# Patient Record
Sex: MALE | Race: WHITE | NOT HISPANIC OR LATINO
[De-identification: names, ages, dates, MRNs, and addresses within clinical notes are randomized per-mention and may not be internally consistent; named-entity substitution may affect disease eponyms.]

---

## 2017-03-21 ENCOUNTER — RX ONLY (OUTPATIENT)
Age: 47
Setting detail: RX ONLY
End: 2017-03-21

## 2017-03-21 PROBLEM — D23.39 OTHER BENIGN NEOPLASM OF SKIN OF OTHER PARTS OF FACE: Status: ACTIVE | Noted: 2017-03-21

## 2017-03-21 PROBLEM — D23.4 OTHER BENIGN NEOPLASM OF SKIN OF SCALP AND NECK: Status: ACTIVE | Noted: 2017-03-21

## 2017-04-04 PROBLEM — D49.2 NEOPLASM OF UNSPECIFIED BEHAVIOR OF BONE, SOFT TISSUE, AND SKIN: Status: RESOLVED | Noted: 2017-03-21 | Resolved: 2017-04-04

## 2018-12-27 ENCOUNTER — OFFICE VISIT (OUTPATIENT)
Dept: URGENT CARE | Facility: PHYSICIAN GROUP | Age: 48
End: 2018-12-27
Payer: COMMERCIAL

## 2018-12-27 VITALS
TEMPERATURE: 99.3 F | SYSTOLIC BLOOD PRESSURE: 128 MMHG | WEIGHT: 234 LBS | HEIGHT: 69 IN | BODY MASS INDEX: 34.66 KG/M2 | OXYGEN SATURATION: 97 % | RESPIRATION RATE: 16 BRPM | DIASTOLIC BLOOD PRESSURE: 80 MMHG | HEART RATE: 79 BPM

## 2018-12-27 DIAGNOSIS — J22 LOWER RESP. TRACT INFECTION: ICD-10-CM

## 2018-12-27 DIAGNOSIS — J06.9 VIRAL URI WITH COUGH: ICD-10-CM

## 2018-12-27 PROCEDURE — 99214 OFFICE O/P EST MOD 30 MIN: CPT | Performed by: NURSE PRACTITIONER

## 2018-12-27 RX ORDER — ALBUTEROL SULFATE 90 UG/1
2 AEROSOL, METERED RESPIRATORY (INHALATION) EVERY 6 HOURS PRN
Qty: 1 INHALER | Refills: 0 | Status: SHIPPED | OUTPATIENT
Start: 2018-12-27 | End: 2021-11-18

## 2018-12-27 RX ORDER — BENZONATATE 200 MG/1
200 CAPSULE ORAL 3 TIMES DAILY PRN
Qty: 30 CAP | Refills: 0 | Status: SHIPPED | OUTPATIENT
Start: 2018-12-27 | End: 2021-03-11

## 2018-12-27 RX ORDER — CARBAMAZEPINE 300 MG/1
300 CAPSULE, EXTENDED RELEASE ORAL 2 TIMES DAILY
Refills: 6 | COMMUNITY

## 2018-12-27 RX ORDER — AZITHROMYCIN 250 MG/1
TABLET, FILM COATED ORAL
Qty: 6 TAB | Refills: 0 | Status: SHIPPED | OUTPATIENT
Start: 2018-12-27 | End: 2021-03-11

## 2018-12-27 RX ORDER — TIMOLOL MALEATE 5 MG/ML
1 SOLUTION/ DROPS OPHTHALMIC DAILY
Refills: 6 | COMMUNITY
Start: 2018-11-21 | End: 2021-03-11

## 2018-12-27 ASSESSMENT — ENCOUNTER SYMPTOMS
PALPITATIONS: 0
DOUBLE VISION: 0
DIZZINESS: 0
FEVER: 0
CHILLS: 1
WHEEZING: 0
STRIDOR: 0
MUSCULOSKELETAL NEGATIVE: 1
DIARRHEA: 0
CONSTIPATION: 0
BLURRED VISION: 0
NAUSEA: 0
SORE THROAT: 1
VOMITING: 0
COUGH: 1
ABDOMINAL PAIN: 0
HEADACHES: 0
MYALGIAS: 0

## 2018-12-28 NOTE — PROGRESS NOTES
Subjective:   Domenic Glover is a 48 y.o. male who presents for Cough (x 5 days); Pharyngitis; Headache; and Chills        HPI   Patient presents with new onset sore throat, cough, and chills that started 5 days ago. He states the cough is intermittent and worse at night, productive at times with yellow sputum. He has been taking OTC medications with little relief. Denies alleviating factors.  Rates symptoms as moderate in severity.    He has received flu vaccine this season    Review of Systems   Constitutional: Positive for chills. Negative for fever and malaise/fatigue.   HENT: Positive for sore throat. Negative for congestion, ear discharge and ear pain.    Eyes: Negative for blurred vision and double vision.   Respiratory: Positive for cough. Negative for wheezing and stridor.    Cardiovascular: Negative for chest pain and palpitations.   Gastrointestinal: Negative for abdominal pain, constipation, diarrhea, nausea and vomiting.   Musculoskeletal: Negative.  Negative for myalgias.   Skin: Negative.  Negative for itching and rash.   Neurological: Negative for dizziness and headaches.   All other systems reviewed and are negative.    PMH:  has a past medical history of Abdominal pain, other specified site (2/14/2013); Allergic rhinitis (2/3/2012); Anesthesia; Arrhythmia; Arthritis; Cough (4/1/2013); Dental disorder; Flank pain (10/31/2012); GERD (gastroesophageal reflux disease); HEMATURIA (10/31/2012); History of nephrolithiasis (10/31/2012); Hypertriglyceridemia (10/31/2012); LBP (low back pain) (11/15/2012); MDD (major depressive disorder) (11/15/2012); Pain (02-20-12); Pneumonia (01/2009); Premature ventricular contractions (PVCs) (VPCs) (2/3/2012); Psychiatric problem; PVCs (premature ventricular contractions); Renal disorder (09/2009); and Snoring.  MEDS:   Current Outpatient Prescriptions:   •  albuterol 108 (90 Base) MCG/ACT Aero Soln inhalation aerosol, Inhale 2 Puffs by mouth every 6 hours as needed  "for Shortness of Breath., Disp: 1 Inhaler, Rfl: 0  •  benzonatate (TESSALON) 200 MG capsule, Take 1 Cap by mouth 3 times a day as needed for Cough., Disp: 30 Cap, Rfl: 0  •  azithromycin (ZITHROMAX) 250 MG Tab, Take two tabs po day one followed by one tab po day two through five with food, Disp: 6 Tab, Rfl: 0  •  escitalopram (LEXAPRO) 10 MG Tab, 10 mg., Disp: , Rfl: 5  •  carbamazepine (CARBATROL) 300 MG CR capsule, Take 300 mg by mouth 2 times a day., Disp: , Rfl: 6  •  timolol (TIMOPTIC) 0.5 % Solution, Place 1 Drop in right eye every day., Disp: , Rfl: 6  •  Multiple Vitamin (MULTIVITAMIN PO), Take  by mouth every day., Disp: , Rfl:   •  ibuprofen (MOTRIN IB) 200 MG TABS, Take 4 Tabs by mouth 2 times a day as needed for Mild Pain., Disp: , Rfl:   ALLERGIES:   Allergies   Allergen Reactions   • Other Drug      Numbing eye gtt, eye swelling     SURGHX:   Past Surgical History:   Procedure Laterality Date   • SHOULDER ARTHROSCOPY W/ BICIPITAL TENODESIS REPAIR  2/22/2012    Performed by STEVENSON BRUCE at SUR[facility]ERY [facility] ORS   • SHOULDER DECOMPRESSION  2/22/2012    Performed by STEVENSON BRUCE at SUR[facility] ORS   • CASE CANCELLED  2/1/2012    Performed by STEVENSON BRUCE at [facility] ORS   • KNEE ARTHROSCOPY  11/12/2009    Performed by ANGELA INFANTE at SURGERY [facility] ORS   • INGUINAL HERNIA REPAIR  2007    left   • BICEPS TENDON REPAIR  2005    left   • BICEPS TENDON REPAIR  2005    hardware removal   • OTHER  1996    tonsillectomy   • OTHER  1995    sinus surgery   • HUMERUS ORIF  1990    left   • HUMERUS ORIF  1990    hardware removal     SOCHX:  reports that he has never smoked. He has never used smokeless tobacco. He reports that he does not drink alcohol or use drugs.  FH: Family history was reviewed, no pertinent findings to report     Objective:   /80   Pulse 79   Temp 37.4 °C (99.3 °F) (Temporal)   Resp 16   Ht 1.753 m (5' 9\")   Wt 106.1 kg (234 lb)   SpO2 97%   BMI " 34.56 kg/m²   Physical Exam   Constitutional: He is oriented to person, place, and time. He appears well-developed and well-nourished.  Non-toxic appearance. He does not have a sickly appearance. He does not appear ill. No distress.   HENT:   Head: Normocephalic.   Right Ear: Hearing and ear canal normal. Tympanic membrane is not erythematous. A middle ear effusion is present.   Left Ear: Hearing, tympanic membrane and ear canal normal. Tympanic membrane is not erythematous.  No middle ear effusion.   Nose: No rhinorrhea. Right sinus exhibits no maxillary sinus tenderness and no frontal sinus tenderness. Left sinus exhibits no maxillary sinus tenderness and no frontal sinus tenderness.   Mouth/Throat: Oropharynx is clear and moist and mucous membranes are normal. No posterior oropharyngeal erythema. No tonsillar exudate.   Eyes: Pupils are equal, round, and reactive to light. Conjunctivae, EOM and lids are normal.   Neck: Normal range of motion. No thyromegaly present.   Cardiovascular: Normal rate, regular rhythm and normal heart sounds.    Pulmonary/Chest: Effort normal. No accessory muscle usage. No apnea, no tachypnea and no bradypnea. No respiratory distress. He has no decreased breath sounds. He has wheezes in the right upper field.   Abdominal: Soft. Bowel sounds are normal. There is no hepatosplenomegaly.   Lymphadenopathy:        Head (right side): No submandibular and no tonsillar adenopathy present.        Head (left side): No submandibular and no tonsillar adenopathy present.   Neurological: He is alert and oriented to person, place, and time.   Skin: Skin is warm and dry. No rash noted. He is not diaphoretic.   Psychiatric: He has a normal mood and affect. His speech is normal and behavior is normal. Judgment and thought content normal.   Vitals reviewed.        Assessment/Plan:   Assessment    1. Viral URI with cough  - albuterol 108 (90 Base) MCG/ACT Aero Soln inhalation aerosol; Inhale 2 Puffs by  mouth every 6 hours as needed for Shortness of Breath.  Dispense: 1 Inhaler; Refill: 0  - benzonatate (TESSALON) 200 MG capsule; Take 1 Cap by mouth 3 times a day as needed for Cough.  Dispense: 30 Cap; Refill: 0    2. Lower resp. tract infection  - azithromycin (ZITHROMAX) 250 MG Tab; Take two tabs po day one followed by one tab po day two through five with food  Dispense: 6 Tab; Refill: 0    Other orders  - carbamazepine (CARBATROL) 300 MG CR capsule; Take 300 mg by mouth 2 times a day.; Refill: 6  - timolol (TIMOPTIC) 0.5 % Solution; Place 1 Drop in right eye every day.; Refill: 6    It was explained to the patient today that due to the viral nature of the patient's illness, we will treat symptomatically. Encouraged OTC supportive meds PRN. Humidification and increase fluids.     Contingent antibiotic prescription given to patient to fill upon meeting criteria of guidelines discussed.     Patient encouraged to increase clear liquid intake    Differential diagnosis, natural history, supportive care, and indications for immediate follow-up discussed.

## 2018-12-30 ENCOUNTER — OFFICE VISIT (OUTPATIENT)
Dept: URGENT CARE | Facility: PHYSICIAN GROUP | Age: 48
End: 2018-12-30
Payer: COMMERCIAL

## 2018-12-30 VITALS
WEIGHT: 233 LBS | TEMPERATURE: 98.4 F | SYSTOLIC BLOOD PRESSURE: 128 MMHG | HEART RATE: 78 BPM | DIASTOLIC BLOOD PRESSURE: 78 MMHG | BODY MASS INDEX: 34.41 KG/M2 | RESPIRATION RATE: 16 BRPM | OXYGEN SATURATION: 94 %

## 2018-12-30 DIAGNOSIS — J06.9 URI WITH COUGH AND CONGESTION: ICD-10-CM

## 2018-12-30 DIAGNOSIS — J01.10 ACUTE NON-RECURRENT FRONTAL SINUSITIS: ICD-10-CM

## 2018-12-30 DIAGNOSIS — H66.012 ACUTE SUPPURATIVE OTITIS MEDIA OF LEFT EAR WITH SPONTANEOUS RUPTURE OF TYMPANIC MEMBRANE, RECURRENCE NOT SPECIFIED: ICD-10-CM

## 2018-12-30 PROCEDURE — 99214 OFFICE O/P EST MOD 30 MIN: CPT | Performed by: NURSE PRACTITIONER

## 2018-12-30 RX ORDER — AMOXICILLIN AND CLAVULANATE POTASSIUM 875; 125 MG/1; MG/1
1 TABLET, FILM COATED ORAL 2 TIMES DAILY
Qty: 14 TAB | Refills: 0 | Status: SHIPPED | OUTPATIENT
Start: 2018-12-30 | End: 2019-01-06

## 2019-01-03 ASSESSMENT — ENCOUNTER SYMPTOMS
SHORTNESS OF BREATH: 0
FEVER: 0
SORE THROAT: 0
HEMOPTYSIS: 0
CHILLS: 0
SINUS PAIN: 1
WEAKNESS: 0
COUGH: 1
WHEEZING: 0
MYALGIAS: 0
SPUTUM PRODUCTION: 0
DIAPHORESIS: 0

## 2019-01-03 NOTE — PROGRESS NOTES
Subjective:      Domenic Glover is a 48 y.o. male who presents with Cough (with L ear ruptured)            Patient was seen in urgent care 3 day ago with URI with cough.  He was prescribed a Z-pack, tessalon, and albuterol for a lower RTI.  He reports that his cough and chest congestion have improved, but he noted worsening nasal congestion with sinus pressure in the frontal region and left otalgia.  Twice he has had bloody, purulent drainage from the left ear in the past 24 hours.  He notes tinnitus in the left ear.  He denies any fever, chills, or myalgias.  He has frequent OM as a child, but not as an adult.          Review of Systems   Constitutional: Positive for malaise/fatigue. Negative for chills, diaphoresis and fever.   HENT: Positive for congestion, ear discharge, ear pain, sinus pain and tinnitus. Negative for hearing loss and sore throat.    Respiratory: Positive for cough. Negative for hemoptysis, sputum production, shortness of breath and wheezing.    Cardiovascular: Negative for chest pain.   Musculoskeletal: Negative for myalgias.   Neurological: Negative for weakness.     Medications, Allergies, and current problem list reviewed today in Epic     Objective:     /78   Pulse 78   Temp 36.9 °C (98.4 °F) (Temporal)   Resp 16   Wt 105.7 kg (233 lb)   SpO2 94%   BMI 34.41 kg/m²      Physical Exam   Constitutional: He is oriented to person, place, and time. He appears well-developed and well-nourished. No distress.   HENT:   Head: Normocephalic.   Right Ear: External ear normal.   Mouth/Throat: Oropharynx is clear and moist. No oropharyngeal exudate.   Nares patent but congested.  Frontal sinus TTP.  Left TM is erythematous with purulence behind the drum.  TM is presently intact with evidence of recent self-limited rupture with a small amount of bloody purulence int he canal.  NO canal swelling or erythema.  No pre- or post-auricular adenopathy.  NO mastoid swelling or TTP.  Hearing grossly  intact to voice.     Eyes: Pupils are equal, round, and reactive to light. Conjunctivae are normal. Right eye exhibits no discharge. Left eye exhibits no discharge. No scleral icterus.   Neck: Neck supple. No JVD present. No tracheal deviation present. No thyromegaly present.   Cardiovascular: Normal rate, regular rhythm and normal heart sounds.  Exam reveals no gallop and no friction rub.    No murmur heard.  Pulmonary/Chest: Effort normal and breath sounds normal. No stridor. No respiratory distress. He has no wheezes. He has no rales. He exhibits no tenderness.   No cough in clinic.   Musculoskeletal: He exhibits no edema.   Lymphadenopathy:     He has no cervical adenopathy.   Neurological: He is alert and oriented to person, place, and time.   Skin: Skin is warm and dry. He is not diaphoretic. No erythema. No pallor.   Vitals reviewed.              Assessment/Plan:     1. Acute suppurative otitis media of left ear with spontaneous rupture of tympanic membrane, recurrence not specified  amoxicillin-clavulanate (AUGMENTIN) 875-125 MG Tab   2. Acute non-recurrent frontal sinusitis  amoxicillin-clavulanate (AUGMENTIN) 875-125 MG Tab   3. URI with cough and congestion       Discussed exam findings with patient.  Differential reviewed.  Discontinue Zpack.  Take full course of antibiotics (augmentin).  OTC NSAIDs or tylenol prn fever, pain.  OTC cold medications prn symptom management.  Maintain adequate po hydration.  RTC in 5-7 days if symptoms persist, sooner if worse.  Patient verbalized understanding of and agreed with plan of care.

## 2021-03-11 ENCOUNTER — OFFICE VISIT (OUTPATIENT)
Dept: URGENT CARE | Facility: PHYSICIAN GROUP | Age: 51
End: 2021-03-11
Payer: COMMERCIAL

## 2021-03-11 VITALS
BODY MASS INDEX: 34.07 KG/M2 | HEIGHT: 69 IN | DIASTOLIC BLOOD PRESSURE: 76 MMHG | WEIGHT: 230 LBS | OXYGEN SATURATION: 96 % | SYSTOLIC BLOOD PRESSURE: 122 MMHG | RESPIRATION RATE: 16 BRPM | HEART RATE: 68 BPM | TEMPERATURE: 97.5 F

## 2021-03-11 DIAGNOSIS — H69.92 DYSFUNCTION OF LEFT EUSTACHIAN TUBE: ICD-10-CM

## 2021-03-11 PROCEDURE — 99213 OFFICE O/P EST LOW 20 MIN: CPT | Performed by: PHYSICIAN ASSISTANT

## 2021-03-11 RX ORDER — ESCITALOPRAM OXALATE 20 MG/1
20 TABLET ORAL DAILY
COMMUNITY

## 2021-03-11 NOTE — PROGRESS NOTES
Chief Complaint   Patient presents with   • Otalgia       HISTORY OF PRESENT ILLNESS: Patient is a 51 y.o. male who presents today because he has a 1 to 2-day history of left ear pain and sometimes dizziness.  He has not taken any medications for symptoms.  Denies any fevers, chills, nausea, vomiting or diarrhea    Patient Active Problem List    Diagnosis Date Noted   • Hypertriglyceridemia 10/31/2012   • Premature ventricular contractions (PVCs) (VPCs) 02/03/2012   • MDD (major depressive disorder) 11/15/2012   • Low back pain 11/15/2012   • Flank pain 10/31/2012   • History of nephrolithiasis 10/31/2012   • HEMATURIA 10/31/2012   • Allergic rhinitis due to allergen 02/03/2012   • Ingrown toenail 10/29/2013   • Sinusitis 10/29/2013   • Cough 04/01/2013   • GERD (gastroesophageal reflux disease) 02/14/2013   • Abdominal pain, other specified site 02/14/2013       Allergies:Other drug    Current Outpatient Medications Ordered in Epic   Medication Sig Dispense Refill   • carbamazepine (CARBATROL) 300 MG CR capsule Take 300 mg by mouth 2 times a day.  6   • escitalopram (LEXAPRO) 10 MG Tab 10 mg.  5   • ibuprofen (MOTRIN IB) 200 MG TABS Take 4 Tabs by mouth 2 times a day as needed for Mild Pain.     • escitalopram (LEXAPRO) 20 MG tablet TAKE 1 TABLET BY MOUTH DAILY     • albuterol 108 (90 Base) MCG/ACT Aero Soln inhalation aerosol Inhale 2 Puffs by mouth every 6 hours as needed for Shortness of Breath. 1 Inhaler 0   • Multiple Vitamin (MULTIVITAMIN PO) Take  by mouth every day.       No current [facility]-ordered facility-administered medications on file.       Past Medical History:   Diagnosis Date   • Abdominal pain, other specified site 2/14/2013   • Allergic rhinitis 2/3/2012   • Anesthesia     prolonged nausea and vomiting   • Arrhythmia     PVCs with bigemimy, cleared by Dr. Saini   • Arthritis    • Cough 4/1/2013   • Dental disorder     crown lower left   • Flank pain 10/31/2012   • GERD (gastroesophageal reflux  disease)    • HEMATURIA 10/31/2012   • History of nephrolithiasis 10/31/2012   • Hypertriglyceridemia 10/31/2012   • LBP (low back pain) 11/15/2012   • MDD (major depressive disorder) 11/15/2012   • Pain 12    left shoulder, 4/10   • Pneumonia 2009   • Premature ventricular contractions (PVCs) (VPCs) 2/3/2012    2012: Stress echocardiogram negative for ischemia, EF at rest 63%. Holter monitor with multiple interpolated PVCs, with occasional bigemy with pauses, resolve with exercise.    • Psychiatric problem     depression   • PVCs (premature ventricular contractions)    • Renal disorder 2009    kidney stones   • Snoring        Social History     Tobacco Use   • Smoking status: Never Smoker   • Smokeless tobacco: Never Used   Substance Use Topics   • Alcohol use: No   • Drug use: No       Family Status   Relation Name Status   • Mo  Alive   • Fa  Alive   • Sis  Alive   • Bro  Alive   • MGMo     • MGFa     • Sis  Alive   • Son  (Not Specified)     Family History   Problem Relation Age of Onset   • Thyroid Sister    • Cancer Maternal Grandmother         lung   • Heart Disease Maternal Grandfather         smoker   • Lung Disease Son         asthma   • Arthritis Son         scoliosis       ROS:  Review of Systems   Constitutional: Negative for fever, chills, weight loss and malaise/fatigue.   HENT: Positive for left ear pain, no nosebleeds, congestion, sore throat and neck pain.    Eyes: Negative for blurred vision.   Respiratory: Negative for cough, sputum production, shortness of breath and wheezing.    Cardiovascular: Negative for chest pain, palpitations, orthopnea and leg swelling.   Gastrointestinal: Negative for heartburn, nausea, vomiting and abdominal pain.   Genitourinary: Negative for dysuria, urgency and frequency.     Exam:  /76 (BP Location: Right arm, Patient Position: Sitting, BP Cuff Size: Adult)   Pulse 68   Temp 36.4 °C (97.5 °F) (Temporal)   Resp 16   Ht  "1.753 m (5' 9\")   Wt 104 kg (230 lb)   SpO2 96%   General:  Well nourished, well developed male in NAD  Head:Normocephalic, atraumatic  Eyes: PERRLA, EOM within normal limits, no conjunctival injection, no scleral icterus, visual fields and acuity grossly intact.  Ears: Normal shape and symmetry, no tenderness, no discharge. External canals are without any significant edema or erythema. Tympanic membranes are without any inflammation, moderate amount of cloudy fluid behind the tympanic membranes bilaterally, more so on the left with tympanic membrane retraction on the left. Gross auditory acuity is intact  Nose: Symmetrical without tenderness, no discharge.  Mouth: reasonable hygiene, no erythema exudates or tonsillar enlargement.  Neck: no masses, range of motion within normal limits, no tracheal deviation. No obvious thyroid enlargement.  Extremities: no clubbing, cyanosis, or edema.    Please note that this dictation was created using voice recognition software. I have made every reasonable attempt to correct obvious errors, but I expect that there are errors of grammar and possibly content that I did not discover before finalizing the note.    Assessment/Plan:  1. Dysfunction of left eustachian tube     Over-the-counter Sudafed    Followup with primary care in the next 7-10 days if not significantly improving, return to the urgent care or go to the emergency room sooner for any worsening of symptoms.       "

## 2021-11-05 ENCOUNTER — TELEPHONE (OUTPATIENT)
Dept: CARDIOLOGY | Facility: MEDICAL CENTER | Age: 51
End: 2021-11-05

## 2021-11-05 NOTE — TELEPHONE ENCOUNTER
Called patient in regards to upcoming appointment with [facility] on 11/18/2021. Per patient no other cardiologist has been seen since 05/2016. Recent labs have been completed at [facility]. Called [phone] and obtained recent labs. Labs have been scanned into patients chart. Patient is scheduled for an echocardiogram on 11/16/2021.    Confirmed appointment date, time, & location. Advised to please wear a mask and to call main office if their were any questions or concerns.

## 2021-11-16 ENCOUNTER — APPOINTMENT (OUTPATIENT)
Dept: RADIOLOGY | Facility: MEDICAL CENTER | Age: 51
End: 2021-11-16
Attending: EMERGENCY MEDICINE
Payer: COMMERCIAL

## 2021-11-16 ENCOUNTER — HOSPITAL ENCOUNTER (OUTPATIENT)
Dept: CARDIOLOGY | Facility: MEDICAL CENTER | Age: 51
End: 2021-11-16
Attending: INTERNAL MEDICINE
Payer: COMMERCIAL

## 2021-11-16 ENCOUNTER — HOSPITAL ENCOUNTER (EMERGENCY)
Facility: MEDICAL CENTER | Age: 51
End: 2021-11-16
Attending: EMERGENCY MEDICINE
Payer: COMMERCIAL

## 2021-11-16 VITALS
SYSTOLIC BLOOD PRESSURE: 135 MMHG | TEMPERATURE: 96.7 F | HEART RATE: 59 BPM | RESPIRATION RATE: 16 BRPM | BODY MASS INDEX: 34.58 KG/M2 | WEIGHT: 233.47 LBS | OXYGEN SATURATION: 96 % | DIASTOLIC BLOOD PRESSURE: 86 MMHG | HEIGHT: 69 IN

## 2021-11-16 DIAGNOSIS — R07.9 EXERTIONAL CHEST PAIN: ICD-10-CM

## 2021-11-16 DIAGNOSIS — R03.0 ELEVATED BLOOD PRESSURE READING WITHOUT DIAGNOSIS OF HYPERTENSION: ICD-10-CM

## 2021-11-16 DIAGNOSIS — R07.9 CHEST PAIN, UNSPECIFIED TYPE: ICD-10-CM

## 2021-11-16 LAB
ALBUMIN SERPL BCP-MCNC: 4.4 G/DL (ref 3.2–4.9)
ALBUMIN/GLOB SERPL: 2.2 G/DL
ALP SERPL-CCNC: 117 U/L (ref 30–99)
ALT SERPL-CCNC: 17 U/L (ref 2–50)
ANION GAP SERPL CALC-SCNC: 16 MMOL/L (ref 7–16)
AST SERPL-CCNC: 17 U/L (ref 12–45)
BASOPHILS # BLD AUTO: 0.9 % (ref 0–1.8)
BASOPHILS # BLD: 0.06 K/UL (ref 0–0.12)
BILIRUB SERPL-MCNC: 0.3 MG/DL (ref 0.1–1.5)
BUN SERPL-MCNC: 13 MG/DL (ref 8–22)
CALCIUM SERPL-MCNC: 9 MG/DL (ref 8.5–10.5)
CHLORIDE SERPL-SCNC: 106 MMOL/L (ref 96–112)
CO2 SERPL-SCNC: 20 MMOL/L (ref 20–33)
CREAT SERPL-MCNC: 0.88 MG/DL (ref 0.5–1.4)
EKG IMPRESSION: NORMAL
EOSINOPHIL # BLD AUTO: 0.22 K/UL (ref 0–0.51)
EOSINOPHIL NFR BLD: 3.5 % (ref 0–6.9)
ERYTHROCYTE [DISTWIDTH] IN BLOOD BY AUTOMATED COUNT: 38.2 FL (ref 35.9–50)
GLOBULIN SER CALC-MCNC: 2 G/DL (ref 1.9–3.5)
GLUCOSE SERPL-MCNC: 101 MG/DL (ref 65–99)
HCT VFR BLD AUTO: 48.3 % (ref 42–52)
HGB BLD-MCNC: 16.5 G/DL (ref 14–18)
IMM GRANULOCYTES # BLD AUTO: 0.08 K/UL (ref 0–0.11)
IMM GRANULOCYTES NFR BLD AUTO: 1.3 % (ref 0–0.9)
LV EJECT FRACT  99904: 60
LYMPHOCYTES # BLD AUTO: 1.39 K/UL (ref 1–4.8)
LYMPHOCYTES NFR BLD: 21.9 % (ref 22–41)
MCH RBC QN AUTO: 28.3 PG (ref 27–33)
MCHC RBC AUTO-ENTMCNC: 34.2 G/DL (ref 33.7–35.3)
MCV RBC AUTO: 82.8 FL (ref 81.4–97.8)
MONOCYTES # BLD AUTO: 0.41 K/UL (ref 0–0.85)
MONOCYTES NFR BLD AUTO: 6.4 % (ref 0–13.4)
NEUTROPHILS # BLD AUTO: 4.2 K/UL (ref 1.82–7.42)
NEUTROPHILS NFR BLD: 66 % (ref 44–72)
NRBC # BLD AUTO: 0 K/UL
NRBC BLD-RTO: 0 /100 WBC
PLATELET # BLD AUTO: 326 K/UL (ref 164–446)
PMV BLD AUTO: 8.8 FL (ref 9–12.9)
POTASSIUM SERPL-SCNC: 4.1 MMOL/L (ref 3.6–5.5)
PROT SERPL-MCNC: 6.4 G/DL (ref 6–8.2)
RBC # BLD AUTO: 5.83 M/UL (ref 4.7–6.1)
SODIUM SERPL-SCNC: 142 MMOL/L (ref 135–145)
TROPONIN T SERPL-MCNC: 9 NG/L (ref 6–19)
TROPONIN T SERPL-MCNC: <6 NG/L (ref 6–19)
WBC # BLD AUTO: 6.4 K/UL (ref 4.8–10.8)

## 2021-11-16 PROCEDURE — 93350 STRESS TTE ONLY: CPT | Mod: 26 | Performed by: INTERNAL MEDICINE

## 2021-11-16 PROCEDURE — 84484 ASSAY OF TROPONIN QUANT: CPT

## 2021-11-16 PROCEDURE — 99284 EMERGENCY DEPT VISIT MOD MDM: CPT | Performed by: INTERNAL MEDICINE

## 2021-11-16 PROCEDURE — 93005 ELECTROCARDIOGRAM TRACING: CPT

## 2021-11-16 PROCEDURE — 85025 COMPLETE CBC W/AUTO DIFF WBC: CPT

## 2021-11-16 PROCEDURE — 99284 EMERGENCY DEPT VISIT MOD MDM: CPT

## 2021-11-16 PROCEDURE — 93018 CV STRESS TEST I&R ONLY: CPT | Performed by: INTERNAL MEDICINE

## 2021-11-16 PROCEDURE — 71045 X-RAY EXAM CHEST 1 VIEW: CPT

## 2021-11-16 PROCEDURE — 80053 COMPREHEN METABOLIC PANEL: CPT

## 2021-11-16 PROCEDURE — 93005 ELECTROCARDIOGRAM TRACING: CPT | Performed by: EMERGENCY MEDICINE

## 2021-11-16 PROCEDURE — 93017 CV STRESS TEST TRACING ONLY: CPT

## 2021-11-16 NOTE — ED TRIAGE NOTES
52 y/o male to triage by w/c   Chief Complaint   Patient presents with   • Chest Pain   • Arm Pain     Pt was sent here by heart inst.  Pt had chest pain during a treadmill stress test

## 2021-11-16 NOTE — PROGRESS NOTES
"Pt arrived for stress echocardiogram.  Test explained to patient and all questions answered.  Pt experienced \"mild\" chest discomfort early into exercise portion of exam.  Chest discomfort resolved with continued exercise.  During Yovany Protocol recovery phase, pt began to experience chest discomfort and LUE discomfort.  EKG changes noted, shown to Dr. Lynch.  Pt's chest discomfort not resolving with extended recovery period. VSS.  Dr. Lynch recommended taking the patient to the ED for further evaluation.  Pt and pt's wife escorted to the ER via WC.  ED Charge Nurse aware of patient's arrival.  "

## 2021-11-17 NOTE — ED PROVIDER NOTES
ED Provider Note    ED Provider Note    Primary care provider: Moses Martinez M.D.  Means of arrival: Wheelchair/walk-in  History obtained from: Patient    CHIEF COMPLAINT  Chief Complaint   Patient presents with   • Chest Pain   • Arm Pain     Seen at 8:33 PM.   HPI  Domenic Glover is a 51 y.o. male who presents to the Emergency Department for chest pain.  The patient was undergoing an exercise treadmill test today in our facility when he developed left-sided chest pain with associated left upper extremity and left lower extremity tingling.  The pain occurred when he was at 85% on the treadmill and had a heart rate of about 141.  The patient noted he did improve after he stopped exerting himself.  He relates a similar experience when he was walking a few months ago, he states when he is walking for about 5 miles he developed some left-sided chest discomfort and left upper extremity tingling.  He saw his primary care doctor and they set him up for the exercise treadmill test that he took today.    He does not smoke, he denies any history of coronary artery disease.  He did have a cardiac evaluation in 2012 for preoperative clearance.  He denies any fevers, chills, nausea, vomiting, abdominal pain, dyspnea on exertion.    REVIEW OF SYSTEMS  See HPI,   Remainder of ROS negative.     PAST MEDICAL HISTORY   has a past medical history of Abdominal pain, other specified site (2/14/2013), Allergic rhinitis (2/3/2012), Anesthesia, Arrhythmia, Arthritis, Cough (4/1/2013), Dental disorder, Flank pain (10/31/2012), GERD (gastroesophageal reflux disease), HEMATURIA (10/31/2012), History of nephrolithiasis (10/31/2012), Hypertriglyceridemia (10/31/2012), LBP (low back pain) (11/15/2012), MDD (major depressive disorder) (11/15/2012), Pain (02-20-12), Pneumonia (01/2009), Premature ventricular contractions (PVCs) (VPCs) (2/3/2012), Psychiatric problem, PVCs (premature ventricular contractions), Renal disorder (09/2009), and  "Snoring.    SURGICAL HISTORY   has a past surgical history that includes inguinal hernia repair (2007); other (1995); other (1996); biceps tendon repair (2005); biceps tendon repair (2005); humerus orif (1990); humerus orif (1990); knee arthroscopy (11/12/2009); case cancelled (2/1/2012); shoulder arthroscopy w/ bicipital tenodesis repair (2/22/2012); and shoulder decompression (2/22/2012).    SOCIAL HISTORY  Social History     Tobacco Use   • Smoking status: Never Smoker   • Smokeless tobacco: Never Used   Substance Use Topics   • Alcohol use: No   • Drug use: No      Social History     Substance and Sexual Activity   Drug Use No       FAMILY HISTORY  Family History   Problem Relation Age of Onset   • Thyroid Sister    • Cancer Maternal Grandmother         lung   • Heart Disease Maternal Grandfather         smoker   • Lung Disease Son         asthma   • Arthritis Son         scoliosis       CURRENT MEDICATIONS  Reviewed.  See Encounter Summary.     ALLERGIES  Allergies   Allergen Reactions   • Other Drug      Numbing eye gtt, eye swelling       PHYSICAL EXAM  VITAL SIGNS: /86   Pulse (!) 59   Temp 35.9 °C (96.7 °F) (Temporal)   Resp 16   Ht 1.753 m (5' 9\")   Wt 106 kg (233 lb 7.5 oz)   SpO2 96%   BMI 34.48 kg/m²   Constitutional: Awake, alert in no apparent distress.  HENT: Normocephalic, Bilateral external ears normal. Nose normal.   Eyes: Conjunctiva normal, non-icteric, EOMI.    Thorax & Lungs: Easy unlabored respirations, Clear to ascultation bilaterally.  Cardiovascular: Regular rate, Regular rhythm, No murmurs, rubs or gallops. Bilateral pulses symmetrical.   Abdomen:  Soft, nontender, nondistended, normal active bowel sounds.   :    Skin: Visualized skin is  Dry, No erythema, No rash.   Musculoskeletal:   No cyanosis, clubbing or edema. No leg asymmetry.   Neurologic: Alert, Grossly non-focal.   Psychiatric: Normal affect, Normal mood  Lymphatic:  No cervical LAD    EKG   12 lead Interpreted " by me  Rhythm:  Normal sinus rhythm   Rate: 82  Axis: normal  Ectopy: none  Conduction: normal  ST Segments: no acute change  T Waves: no acute change  Clinical Impression: Normal EKG without acute changes     RADIOLOGY  DX-CHEST-PORTABLE (1 VIEW)   Final Result      No acute cardiac or pulmonary abnormalities are identified.            COURSE & MEDICAL DECISION MAKING  Pertinent Labs & Imaging studies reviewed. (See chart for details)    Differential diagnoses include but are not limited to: Atypical chest pain, ischemia    8:33 PM - Medical record reviewed, patient seen and examined at bedside.  Case discussed with Dr. Lynch.    10:10 PM: Repeat troponin also negative.  Decision Making:  This is a pleasant 51 y.o. year old male who presents with chest pain while he was having a exercise treadmill test today.  The patient's heart score is 3.  ACS seems very unlikely, troponin x2 not elevated today.  The patient was seen by cardiology who is in agreement with discharge.  Patient is asymptomatic this time, do not suspect pulmonary embolus, dissection.  The patient at this time will be discharged and has follow-up on Thursday.  He is return for any severe chest pain, shortness of breath or any other concern.    Discharge Medications:  Discharge Medication List as of 11/16/2021  9:59 PM          The patient was discharged home (see d/c instructions) was told to return immediately for any signs or symptoms listed, or any worsening at all.  The patient verbally agreed to the discharge precautions and follow-up plan which is documented in EPIC.        FINAL IMPRESSION  1. Exertional chest pain

## 2021-11-17 NOTE — CONSULTS
Reason for Consult:  Asked by Dr Levar Mercado M.D. to see this patient with abnormal stress test    CC:   Chief Complaint   Patient presents with   • Chest Pain   • Arm Pain       HPI:      51 year old man with no cardiovascular medical history presents with chest pain since august. Describes discussed with PMD and underwent stress echo today. Pain without typical anginal features however associated with exertion. During stress developed EKG changes which persisted and chest pain. Sent to ED for further evaluation where EKG changes resolved and found trop T undetectable and without active cardiac complaints. Denies toxic social habits. Father with coronary disease later in life.     Medications / Drug list prior to admission:  No current facility-administered medications on file prior to encounter.     Current Outpatient Medications on File Prior to Encounter   Medication Sig Dispense Refill   • escitalopram (LEXAPRO) 20 MG tablet TAKE 1 TABLET BY MOUTH DAILY     • carbamazepine (CARBATROL) 300 MG CR capsule Take 300 mg by mouth 2 times a day.  6   • albuterol 108 (90 Base) MCG/ACT Aero Soln inhalation aerosol Inhale 2 Puffs by mouth every 6 hours as needed for Shortness of Breath. 1 Inhaler 0   • escitalopram (LEXAPRO) 10 MG Tab 10 mg.  5   • Multiple Vitamin (MULTIVITAMIN PO) Take  by mouth every day.     • ibuprofen (MOTRIN IB) 200 MG TABS Take 4 Tabs by mouth 2 times a day as needed for Mild Pain.         Current list of administered Medications:  No current facility-administered medications for this encounter.    Current Outpatient Medications:   •  escitalopram (LEXAPRO) 20 MG tablet, TAKE 1 TABLET BY MOUTH DAILY, Disp: , Rfl:   •  carbamazepine (CARBATROL) 300 MG CR capsule, Take 300 mg by mouth 2 times a day., Disp: , Rfl: 6  •  albuterol 108 (90 Base) MCG/ACT Aero Soln inhalation aerosol, Inhale 2 Puffs by mouth every 6 hours as needed for Shortness of Breath., Disp: 1 Inhaler, Rfl: 0  •  escitalopram  (LEXAPRO) 10 MG Tab, 10 mg., Disp: , Rfl: 5  •  Multiple Vitamin (MULTIVITAMIN PO), Take  by mouth every day., Disp: , Rfl:   •  ibuprofen (MOTRIN IB) 200 MG TABS, Take 4 Tabs by mouth 2 times a day as needed for Mild Pain., Disp: , Rfl:     Past Medical History:   Diagnosis Date   • Abdominal pain, other specified site 2/14/2013   • Allergic rhinitis 2/3/2012   • Anesthesia     prolonged nausea and vomiting   • Arrhythmia     PVCs with bigemimy, cleared by Dr. Saini   • Arthritis    • Cough 4/1/2013   • Dental disorder     crown lower left   • Flank pain 10/31/2012   • GERD (gastroesophageal reflux disease)    • HEMATURIA 10/31/2012   • History of nephrolithiasis 10/31/2012   • Hypertriglyceridemia 10/31/2012   • LBP (low back pain) 11/15/2012   • MDD (major depressive disorder) 11/15/2012   • Pain 02-20-12    left shoulder, 4/10   • Pneumonia 01/2009   • Premature ventricular contractions (PVCs) (VPCs) 2/3/2012    February 2012: Stress echocardiogram negative for ischemia, EF at rest 63%. Holter monitor with multiple interpolated PVCs, with occasional bigemy with pauses, resolve with exercise.    • Psychiatric problem     depression   • PVCs (premature ventricular contractions)    • Renal disorder 09/2009    kidney stones   • Snoring        Past Surgical History:   Procedure Laterality Date   • SHOULDER ARTHROSCOPY W/ BICIPITAL TENODESIS REPAIR  2/22/2012    Performed by STEVENSON BRUCE at SURGERY [facility] ORS   • SHOULDER DECOMPRESSION  2/22/2012    Performed by STEVENSON BRUCE at SURGERY [facility] ORS   • CASE CANCELLED  2/1/2012    Performed by STEVENSON BRUCE at SURGERY [facility] ORS   • KNEE ARTHROSCOPY  11/12/2009    Performed by ANGELA INFANTE at SURGERY [facility] ORS   • INGUINAL HERNIA REPAIR  2007    left   • BICEPS TENDON REPAIR  2005    left   • BICEPS TENDON REPAIR  2005    hardware removal   • OTHER  1996    tonsillectomy   • OTHER  1995    sinus surgery   • HUMERUS ORIF  1990    left   •  HUMERUS ORIF  1990    hardware removal       Family History   Problem Relation Age of Onset   • Thyroid Sister    • Cancer Maternal Grandmother         lung   • Heart Disease Maternal Grandfather         smoker   • Lung Disease Son         asthma   • Arthritis Son         scoliosis     Patient family history was personally reviewed, no pertinent family history to current presentation    Social History     Socioeconomic History   • Marital status:      Spouse name: Not on file   • Number of children: Not on file   • Years of education: Not on file   • Highest education level: Not on file   Occupational History   • Not on file   Tobacco Use   • Smoking status: Never Smoker   • Smokeless tobacco: Never Used   Substance and Sexual Activity   • Alcohol use: No   • Drug use: No   • Sexual activity: Not Currently     Partners: Female     Comment:    Other Topics Concern   • Not on file   Social History Narrative   • Not on file     Social Determinants of Health     Financial Resource Strain:    • Difficulty of Paying Living Expenses: Not on file   Food Insecurity:    • Worried About Running Out of Food in the Last Year: Not on file   • Ran Out of Food in the Last Year: Not on file   Transportation Needs:    • Lack of Transportation (Medical): Not on file   • Lack of Transportation (Non-Medical): Not on file   Physical Activity:    • Days of Exercise per Week: Not on file   • Minutes of Exercise per Session: Not on file   Stress:    • Feeling of Stress : Not on file   Social Connections:    • Frequency of Communication with Friends and Family: Not on file   • Frequency of Social Gatherings with Friends and Family: Not on file   • Attends Mandaeism Services: Not on file   • Active Member of Clubs or Organizations: Not on file   • Attends Club or Organization Meetings: Not on file   • Marital Status: Not on file   Intimate Partner Violence:    • Fear of Current or Ex-Partner: Not on file   • Emotionally Abused:  "Not on file   • Physically Abused: Not on file   • Sexually Abused: Not on file   Housing Stability:    • Unable to Pay for Housing in the Last Year: Not on file   • Number of Places Lived in the Last Year: Not on file   • Unstable Housing in the Last Year: Not on file       ALLERGIES:  Allergies   Allergen Reactions   • Other Drug      Numbing eye gtt, eye swelling       Review of systems:  A complete review of symptoms was reviewed with patient. This is reviewed in H&P and PMH. ALL OTHERS reviewed and negative    Physical exam:  Patient Vitals for the past 24 hrs:   BP Temp Temp src Pulse Resp SpO2 Height Weight   21 1925 135/86 35.9 °C (96.7 °F) Temporal (!) 59 16 96 % -- --   21 1540 132/86 36.3 °C (97.4 °F) Temporal 66 18 100 % -- --   21 1301 -- -- -- -- -- -- 1.753 m (5' 9\") 106 kg (233 lb 7.5 oz)   21 1253 134/86 36.1 °C (96.9 °F) Temporal 93 17 95 % -- --     General: No acute distress.   EYES: no jaundice  HEENT: OP clear   Neck: No bruits No JVD.   CVS: RRR. S1 + S2. No M/R/G. No edema.  Resp: CTAB. No wheezing or crackles/rhonchi.  Abdomen: Soft, NT, ND,  Skin: Grossly nothing acute no obvious rashes  Neurological: Alert, Moves all extremities, no cranial nerve defects on limited exam  Extremities: Pulse 2+ in b/l LE. No cyanosis.     Data:  Laboratory studies personally reviewed by me:  Recent Results (from the past 24 hour(s))   EKG (NOW)    Collection Time: 21 12:57 PM   Result Value Ref Range    Report       [facility] Emergency Dept.    Test Date:  2021  Pt Name:    SAMI HERRERA                Department: ER  MRN:        [id number]                      Room:  Gender:     Male                         Technician: [id number]  :        1970                   Requested By:ER TRIAGE PROTOCOL  Order #:    [id number]                    Reading MD: ALMA PITTMAN MD    Measurements  Intervals                                Axis  Rate:       82         "                   P:          60  WY:         161                          QRS:        54  QRSD:       85                           T:          77  QT:         362  QTc:        423    Interpretive Statements  Sinus rhythm  Baseline wander in lead(s) V1,V2,V3,V4,V5,V6  Compared to ECG 05/16/2016 08:45:25  Sinus bradycardia no longer present  T-wave abnormality no longer present  Electronically Signed On 11- 20:46:44 PST by ALMA PITTMAN MD     EC-ECHOCARDIOGRAM REST/STRESS W/O CONT    Collection Time: 11/16/21  1:07 PM   Result Value Ref Range    Left Ventrical Ejection Fraction 60    CBC with Differential    Collection Time: 11/16/21  1:24 PM   Result Value Ref Range    WBC 6.4 4.8 - 10.8 K/uL    RBC 5.83 4.70 - 6.10 M/uL    Hemoglobin 16.5 14.0 - 18.0 g/dL    Hematocrit 48.3 42.0 - 52.0 %    MCV 82.8 81.4 - 97.8 fL    MCH 28.3 27.0 - 33.0 pg    MCHC 34.2 33.7 - 35.3 g/dL    RDW 38.2 35.9 - 50.0 fL    Platelet Count 326 164 - 446 K/uL    MPV 8.8 (L) 9.0 - 12.9 fL    Neutrophils-Polys 66.00 44.00 - 72.00 %    Lymphocytes 21.90 (L) 22.00 - 41.00 %    Monocytes 6.40 0.00 - 13.40 %    Eosinophils 3.50 0.00 - 6.90 %    Basophils 0.90 0.00 - 1.80 %    Immature Granulocytes 1.30 (H) 0.00 - 0.90 %    Nucleated RBC 0.00 /100 WBC    Neutrophils (Absolute) 4.20 1.82 - 7.42 K/uL    Lymphs (Absolute) 1.39 1.00 - 4.80 K/uL    Monos (Absolute) 0.41 0.00 - 0.85 K/uL    Eos (Absolute) 0.22 0.00 - 0.51 K/uL    Baso (Absolute) 0.06 0.00 - 0.12 K/uL    Immature Granulocytes (abs) 0.08 0.00 - 0.11 K/uL    NRBC (Absolute) 0.00 K/uL   Complete Metabolic Panel (CMP)    Collection Time: 11/16/21  1:24 PM   Result Value Ref Range    Sodium 142 135 - 145 mmol/L    Potassium 4.1 3.6 - 5.5 mmol/L    Chloride 106 96 - 112 mmol/L    Co2 20 20 - 33 mmol/L    Anion Gap 16.0 7.0 - 16.0    Glucose 101 (H) 65 - 99 mg/dL    Bun 13 8 - 22 mg/dL    Creatinine 0.88 0.50 - 1.40 mg/dL    Calcium 9.0 8.5 - 10.5 mg/dL    AST(SGOT) 17 12 - 45 U/L     ALT(SGPT) 17 2 - 50 U/L    Alkaline Phosphatase 117 (H) 30 - 99 U/L    Total Bilirubin 0.3 0.1 - 1.5 mg/dL    Albumin 4.4 3.2 - 4.9 g/dL    Total Protein 6.4 6.0 - 8.2 g/dL    Globulin 2.0 1.9 - 3.5 g/dL    A-G Ratio 2.2 g/dL   Troponin    Collection Time: 11/16/21  1:24 PM   Result Value Ref Range    Troponin T <6 6 - 19 ng/L   ESTIMATED GFR    Collection Time: 11/16/21  1:24 PM   Result Value Ref Range    GFR If African American >60 >60 mL/min/1.73 m 2    GFR If Non African American >60 >60 mL/min/1.73 m 2       EKG interpreted by me sinus rhythm    All pertinent features of laboratory and imaging reviewed including primary images where applicable    Stress TTE 11/2021  CONCLUSIONS  Appropriate augmentation of left ventricular function after maximal exercise   with decrease in end-systolic   dimensions.  Ischemic changes on ECG with stress persisting through recovery.  Hypertensive baseline.  Occasional PVCs. No sustained arrhythmias.   [facility] treadmill score +1.5; moderate risk.    Active Problems:    * No active hospital problems. *  Resolved Problems:    * No resolved hospital problems. *      Assessment / Plan:  51 year old man with no cardiovascular medical history presents with chest pain found abnormal stress echo.    -trop T negative x1 as is repeat EKG for ischemia. If repeat troponin negative, ruled out for coronary disease. HEART score 3; low. Can follow up with primary care physician as well as outpatient cardiologist later this week.     I personally discussed his case with Dr Mercado.    It is my pleasure to participate in the care of Mr. Glover.  Please do not hesitate to contact me with questions or concerns.    Olvin Lynch MD  Cardiologist [facility]

## 2021-11-18 ENCOUNTER — OFFICE VISIT (OUTPATIENT)
Dept: CARDIOLOGY | Facility: PHYSICIAN GROUP | Age: 51
End: 2021-11-18
Payer: COMMERCIAL

## 2021-11-18 VITALS
WEIGHT: 234 LBS | SYSTOLIC BLOOD PRESSURE: 100 MMHG | HEART RATE: 67 BPM | OXYGEN SATURATION: 95 % | HEIGHT: 69 IN | BODY MASS INDEX: 34.66 KG/M2 | DIASTOLIC BLOOD PRESSURE: 62 MMHG

## 2021-11-18 DIAGNOSIS — E78.1 HYPERTRIGLYCERIDEMIA: ICD-10-CM

## 2021-11-18 DIAGNOSIS — R07.89 OTHER CHEST PAIN: ICD-10-CM

## 2021-11-18 DIAGNOSIS — I49.3 PREMATURE VENTRICULAR CONTRACTIONS (PVCS) (VPCS): ICD-10-CM

## 2021-11-18 PROCEDURE — 99215 OFFICE O/P EST HI 40 MIN: CPT | Performed by: INTERNAL MEDICINE

## 2021-11-18 RX ORDER — ROSUVASTATIN CALCIUM 10 MG/1
10 TABLET, COATED ORAL EVERY EVENING
Qty: 90 TABLET | Refills: 3 | Status: SHIPPED | OUTPATIENT
Start: 2021-11-18 | End: 2022-11-17 | Stop reason: SDUPTHER

## 2021-11-18 RX ORDER — NITROGLYCERIN 0.4 MG/1
0.4 TABLET SUBLINGUAL PRN
Qty: 25 TABLET | Refills: 1 | Status: SHIPPED | OUTPATIENT
Start: 2021-11-18 | End: 2022-11-17 | Stop reason: SDUPTHER

## 2021-11-18 ASSESSMENT — FIBROSIS 4 INDEX: FIB4 SCORE: 0.65

## 2021-11-18 NOTE — PATIENT INSTRUCTIONS
Please start metoprolol tartrate 12.5mg twice a day.     Please start crestor (also called rosuvastatin) 10mg once a day.     Please start aspirin 81mg once a day.     Please schedule your CT scan and get non-fasting blood tests within a week of your scan.     Please call our office if you experience shortness of breath, decreased ability to exercise, or start having chest pain which goes away with rest or use of nitroglycerin.  If you experience chest pain that does not resolve with nitroglycerin and/or rest, please call 911 for emergency evaluation.

## 2021-11-18 NOTE — PROGRESS NOTES
Cardiology Follow-up Consultation Note    Date of note:    11/18/2021    Primary Care Provider: Moses Martinez M.D.  Referring Provider: Dr. Lynch    Patient Name: Domenic Glover   YOB: 1970  MRN:              [id number]    Chief Complaint: Chest pain    History of Present Illness: Domenic Glover is a 51 y.o. male who is here for cardiac consultation for chest pain.    Starting in late August, he would start getting left sided chest discomfort which was not necessarily exertional. He also had occasional sharp chest wall pains.    He did have one episode in mid September he had exertional chest pain, SOB, and palpitations which stopped at rest. He then saw his PCP who ordered a stress test. 3/10 at the time.     Initially seen by Dr. Lynch 11/16/2021 in the ER after he had chest pain during a stress test. Had negative troponins x 2 and then was DC'd home with cardiology follow-up.     No change with position or eating. Or deep breathing.       ROS  + occasional left leg numbness.     Constitution: Negative for chills, fever and night sweats.   HENT: Negative for nosebleeds.    Eyes: Negative for vision loss in left eye and vision loss in right eye.   Respiratory: Negative for hemoptysis.    Gastrointestinal: Negative for hematemesis, hematochezia and melena.   Genitourinary: Negative for hematuria.   Neurological: Negative for focal weakness, numbness and paresthesias.       All other systems reviewed and discussed using a comprehensive questionnaire and are negative.       Past Medical History:   Diagnosis Date   • Abdominal pain, other specified site 2/14/2013   • Allergic rhinitis 2/3/2012   • Anesthesia     prolonged nausea and vomiting   • Arthritis    • Cough 4/1/2013   • Dental disorder     crown lower left   • Flank pain 10/31/2012   • GERD (gastroesophageal reflux disease)    • HEMATURIA 10/31/2012   • History of nephrolithiasis 10/31/2012   • Hypertriglyceridemia  10/31/2012   • LBP (low back pain) 11/15/2012   • MDD (major depressive disorder) 11/15/2012   • Pain 02-20-12    left shoulder, 4/10   • Pneumonia 01/2009   • Premature ventricular contractions (PVCs) (VPCs) 02/03/2012 February 2012: Stress echocardiogram negative for ischemia, EF at rest 63%. Holter monitor with multiple interpolated PVCs, with occasional bigemy with pauses, resolve with exercise.    • Psychiatric problem     depression   • Renal disorder 09/2009    kidney stones   • Snoring          Past Surgical History:   Procedure Laterality Date   • SHOULDER ARTHROSCOPY W/ BICIPITAL TENODESIS REPAIR  2/22/2012    Performed by STEVENSON BRUCE at SURGERY [facility] ORS   • SHOULDER DECOMPRESSION  2/22/2012    Performed by STEVENSON BRUCE at SURGERY [facility] ORS   • CASE CANCELLED  2/1/2012    Performed by STEVENSON BRUCE at SURGERY [facility] ORS   • KNEE ARTHROSCOPY  11/12/2009    Performed by ANGELA INFANTE at SURGERY [facility] ORS   • INGUINAL HERNIA REPAIR  2007    left   • BICEPS TENDON REPAIR  2005    left   • BICEPS TENDON REPAIR  2005    hardware removal   • OTHER  1996    tonsillectomy   • OTHER  1995    sinus surgery   • HUMERUS ORIF  1990    left   • HUMERUS ORIF  1990    hardware removal         Current Outpatient Medications   Medication Sig Dispense Refill   • escitalopram (LEXAPRO) 20 MG tablet TAKE 1 TABLET BY MOUTH DAILY     • carbamazepine (CARBATROL) 300 MG CR capsule Take 300 mg by mouth 2 times a day.  6   • ibuprofen (MOTRIN IB) 200 MG TABS Take 4 Tabs by mouth 2 times a day as needed for Mild Pain.       No current facility-administered medications for this visit.         Allergies   Allergen Reactions   • Other Drug      Numbing eye gtt, eye swelling         Family History   Problem Relation Age of Onset   • Heart Disease Mother    • Heart Disease Father    • Thyroid Sister    • Cancer Maternal Grandmother         lung   • Heart Disease Maternal Grandfather         smoker   •  Lung Disease Son         asthma   • Arthritis Son         scoliosis         Social History     Socioeconomic History   • Marital status:      Spouse name: Not on file   • Number of children: Not on file   • Years of education: Not on file   • Highest education level: Not on file   Occupational History   • Not on file   Tobacco Use   • Smoking status: Never Smoker   • Smokeless tobacco: Never Used   Vaping Use   • Vaping Use: Never used   Substance and Sexual Activity   • Alcohol use: No   • Drug use: No   • Sexual activity: Not Currently     Partners: Female     Comment:    Other Topics Concern   • Not on file   Social History Narrative         Social Determinants of Health     Financial Resource Strain:    • Difficulty of Paying Living Expenses: Not on file   Food Insecurity:    • Worried About Running Out of Food in the Last Year: Not on file   • Ran Out of Food in the Last Year: Not on file   Transportation Needs:    • Lack of Transportation (Medical): Not on file   • Lack of Transportation (Non-Medical): Not on file   Physical Activity:    • Days of Exercise per Week: Not on file   • Minutes of Exercise per Session: Not on file   Stress:    • Feeling of Stress : Not on file   Social Connections:    • Frequency of Communication with Friends and Family: Not on file   • Frequency of Social Gatherings with Friends and Family: Not on file   • Attends Worship Services: Not on file   • Active Member of Clubs or Organizations: Not on file   • Attends Club or Organization Meetings: Not on file   • Marital Status: Not on file   Intimate Partner Violence:    • Fear of Current or Ex-Partner: Not on file   • Emotionally Abused: Not on file   • Physically Abused: Not on file   • Sexually Abused: Not on file   Housing Stability:    • Unable to Pay for Housing in the Last Year: Not on file   • Number of Places Lived in the Last Year: Not on file   • Unstable Housing in the Last Year: Not on file  "        Physical Exam:  Ambulatory Vitals  /62 (BP Location: Left arm, Patient Position: Sitting, BP Cuff Size: Adult)   Pulse 67   Ht 1.753 m (5' 9\")   Wt 106 kg (234 lb)   SpO2 95%    Oxygen Therapy:  Pulse Oximetry: 95 %  BP Readings from Last 4 Encounters:   11/18/21 100/62   11/16/21 135/86   03/11/21 122/76   12/30/18 128/78       Weight/BMI: Body mass index is 34.56 kg/m².  Wt Readings from Last 4 Encounters:   11/18/21 106 kg (234 lb)   11/16/21 106 kg (233 lb 7.5 oz)   03/11/21 104 kg (230 lb)   12/30/18 106 kg (233 lb)           General: No apparent distress  Eyes: nl conjunctiva  ENT: OP covered by mask.   Neck: JVP 4 cm H2O, no carotid bruits  Lungs: normal respiratory effort, CTAB  Heart: RRR, no murmurs, no rubs or gallops, trace edema bilateral lower extremities L>R. No LV/RV heave on cardiac palpatation. 2+ bilateral radial pulses.  2+ bilateral dp pulses.   Abdomen: soft, non tender, non distended, no masses, normal bowel sounds.  No HSM.  Extremities/MSK: no clubbing, no cyanosis  Neurological: No focal sensory deficits  Psychiatric: Appropriate affect, A/O x 3, intact judgement and insight  Skin: Warm extremities      Lab Data Review:  Lab Results   Component Value Date/Time    CHOLSTRLTOT 185 10/01/2013 10:45 AM     (H) 10/01/2013 10:45 AM    HDL 39 (A) 10/01/2013 10:45 AM    TRIGLYCERIDE 151 (H) 10/01/2013 10:45 AM       Lab Results   Component Value Date/Time    SODIUM 142 11/16/2021 01:24 PM    POTASSIUM 4.1 11/16/2021 01:24 PM    CHLORIDE 106 11/16/2021 01:24 PM    CO2 20 11/16/2021 01:24 PM    GLUCOSE 101 (H) 11/16/2021 01:24 PM    BUN 13 11/16/2021 01:24 PM    CREATININE 0.88 11/16/2021 01:24 PM     Lab Results   Component Value Date/Time    ALKPHOSPHAT 117 (H) 11/16/2021 01:24 PM    ASTSGOT 17 11/16/2021 01:24 PM    ALTSGPT 17 11/16/2021 01:24 PM    TBILIRUBIN 0.3 11/16/2021 01:24 PM      Lab Results   Component Value Date/Time    WBC 6.4 11/16/2021 01:24 PM     No " components found for: HBGA1C  No components found for: TROPONIN  No results found for: BNP    OSH labs     tg 212  HDL 38        Cardiac Imaging and Procedures Review:    EKG dated 2021 : My personal interpretation is NSR, non-specific st changes.     Stress TTE 2021  CONCLUSIONS  Appropriate augmentation of left ventricular function after maximal exercise   with decrease in end-systolic   dimensions.  Ischemic changes on ECG with stress persisting through recovery.  Hypertensive baseline.  Occasional PVCs. No sustained arrhythmias.   [facility] treadmill score +1.5; moderate risk.    Personally reviewed. 8 minutes on a treadmill.       Radiology test Review:  CXR: 2021  FINDINGS:  Heart size is within normal limits.  No pulmonary infiltrates or consolidations are noted.  No pleural abnormalities are noted.       Medical Decision Makin. Premature ventricular contractions (PVCs) (VPCs)  Seen by Dr. Lind in the past. Did have BB at one point which he said caused symptomatic bradycardia into the 30s. Asymptomatic now.   -CTM    2. Hypertriglyceridemia  Start crestor. Did have a bilateral leg rash reaction to statin (unknown which one) when he started on caramazepine (which he is on to help his double vision and sounds like some intracranial eye nerve issues, followed by Dr. Thomas).  -monitor for rash, if reaction, would switch to another agent if necessary depending on CT results.     3. Other chest pain  Worrisome for angina despite negative troponins and relatively normal EKG. Given previous negative stress test (echo stress), will check CTA. High risk condition.   -CTA heart, BMP beforehand.  -ntg prn  -start aspirin/statin/metoprolol. Will stop metop if symptomatic hypotension/bradycardia.   -discussed if more worrisome symptoms despite meds or depending on CTA results, we might proceed with cath.   -ER precatusions    Return in about 6 months (around 2022).      Rylan  MD Benjamín, [facility] [place] [facility], [address]  [address] [place] [address]  [place] [address]  Phone: [phone]  Fax: [phone]

## 2021-12-15 ENCOUNTER — TELEPHONE (OUTPATIENT)
Dept: CARDIOLOGY | Facility: MEDICAL CENTER | Age: 51
End: 2021-12-15

## 2021-12-15 NOTE — TELEPHONE ENCOUNTER
Filled out CTA form, awaiting JM signature and with or without calcium scoring.     Placed in JM folder at [facility] Desk.

## 2021-12-15 NOTE — TELEPHONE ENCOUNTER
----- Message from Vick Pete sent at 12/15/2021 12:16 PM PST -----  Good afternoon,    This patient just called to schedule the 3D Heart CT ordered by Rylan Butts, but we do not have the low radiation dose form that is required to be filled out prior. Can you please get the taken care of and faxed to us at [phone] so we can get him scheduled?    Thank you!

## 2021-12-21 NOTE — TELEPHONE ENCOUNTER
LEX signed CT form.  Faxed to imaging. Received fax confirmation, scanned document and fax confirmation into [employer].

## 2022-01-10 ENCOUNTER — TELEPHONE (OUTPATIENT)
Dept: CARDIOLOGY | Facility: MEDICAL CENTER | Age: 52
End: 2022-01-10

## 2022-01-10 NOTE — TELEPHONE ENCOUNTER
Veronica (spouse) would like a call to discuss the CT needed for Domenic.     Veronica - [phone]    Thank you,   Eulalia PATEL

## 2022-01-10 NOTE — TELEPHONE ENCOUNTER
LEX      Pt called and is needing a ct order sent to [facility], [address] [id number] please.       Thank you,  Sravani DUNN

## 2022-01-10 NOTE — TELEPHONE ENCOUNTER
Called patient spouse Veronica moses back, spoke to the patient. He stated [facility] does not take his insurance and he has to get an approval from his insurance to get the imaging done, but after they got that done they came back and stated they still were not going to cover it.  They are now going to go to go to another facility that does the exam. Advised to contact his insurance and ensure they cover the test and testing facility. Also to let us know once he completes the test so we can request records. Answered all questions and concerns, appreciative of call.

## 2022-01-12 NOTE — TELEPHONE ENCOUNTER
Faxed CT-CTA order to [facility], received fax confirmation, scanned into [facility].     Called patient mobile number and notified him that order for CT has been faxed to [facility]. Answered all questions and concerns, appreciative of call.

## 2022-11-16 ENCOUNTER — TELEPHONE (OUTPATIENT)
Dept: CARDIOLOGY | Facility: MEDICAL CENTER | Age: 52
End: 2022-11-16
Payer: COMMERCIAL

## 2022-11-16 NOTE — TELEPHONE ENCOUNTER
Called patient regarding lab work and CT ordered by JM at last OV. Patient has not had lab work or CT completed. Patient stated he is going to a [facility] lab to complete lab work. Per chart, CT order was previously sent to [facility]. Patient stated he will check on status of order with [facility] and give us a call back. Gave patient office phone number for call back.

## 2022-11-17 ENCOUNTER — OFFICE VISIT (OUTPATIENT)
Dept: CARDIOLOGY | Facility: PHYSICIAN GROUP | Age: 52
End: 2022-11-17
Payer: COMMERCIAL

## 2022-11-17 VITALS
WEIGHT: 242 LBS | HEART RATE: 62 BPM | BODY MASS INDEX: 35.84 KG/M2 | DIASTOLIC BLOOD PRESSURE: 82 MMHG | OXYGEN SATURATION: 97 % | SYSTOLIC BLOOD PRESSURE: 128 MMHG | HEIGHT: 69 IN | RESPIRATION RATE: 14 BRPM

## 2022-11-17 DIAGNOSIS — I25.10 CORONARY ARTERY DISEASE INVOLVING NATIVE CORONARY ARTERY OF NATIVE HEART WITHOUT ANGINA PECTORIS: ICD-10-CM

## 2022-11-17 DIAGNOSIS — E78.2 MIXED HYPERLIPIDEMIA: ICD-10-CM

## 2022-11-17 DIAGNOSIS — G47.33 OSA (OBSTRUCTIVE SLEEP APNEA): ICD-10-CM

## 2022-11-17 DIAGNOSIS — I49.3 PREMATURE VENTRICULAR CONTRACTIONS (PVCS) (VPCS): ICD-10-CM

## 2022-11-17 PROCEDURE — 99214 OFFICE O/P EST MOD 30 MIN: CPT | Performed by: NURSE PRACTITIONER

## 2022-11-17 RX ORDER — ROSUVASTATIN CALCIUM 10 MG/1
10 TABLET, COATED ORAL EVERY EVENING
Qty: 100 TABLET | Refills: 3 | Status: SHIPPED | OUTPATIENT
Start: 2022-11-17 | End: 2023-12-26

## 2022-11-17 RX ORDER — CLOPIDOGREL BISULFATE 75 MG/1
75 TABLET ORAL DAILY
Qty: 100 TABLET | Refills: 3 | Status: SHIPPED | OUTPATIENT
Start: 2022-11-17 | End: 2023-05-27

## 2022-11-17 RX ORDER — CLOPIDOGREL BISULFATE 75 MG/1
75 TABLET ORAL DAILY
COMMUNITY
Start: 2022-08-27 | End: 2022-11-17 | Stop reason: SDUPTHER

## 2022-11-17 RX ORDER — NITROGLYCERIN 0.4 MG/1
0.4 TABLET SUBLINGUAL PRN
Qty: 25 TABLET | Refills: 1 | Status: SHIPPED | OUTPATIENT
Start: 2022-11-17

## 2022-11-17 ASSESSMENT — ENCOUNTER SYMPTOMS
BRUISES/BLEEDS EASILY: 0
PND: 0
ABDOMINAL PAIN: 0
COUGH: 0
SHORTNESS OF BREATH: 0
NAUSEA: 0
MYALGIAS: 0
CHILLS: 0
FEVER: 0
PALPITATIONS: 0
DIZZINESS: 0
HEADACHES: 0
LOSS OF CONSCIOUSNESS: 0
INSOMNIA: 0
ORTHOPNEA: 0

## 2022-11-17 ASSESSMENT — FIBROSIS 4 INDEX: FIB4 SCORE: 0.66

## 2022-11-17 NOTE — PROGRESS NOTES
Chief Complaint   Patient presents with    Follow-Up    Coronary Artery Disease    Hyperlipidemia    Premature Ventricular Contractions (PVCs)       Subjective     Domenic Glover is a 52 y.o. male who presents today for annual follow-up of CAD, PVCs, hyperlipidemia, and KRISTYN.    Domenic is a 52 year old male with history of PVCs and hyperlipidemia, last seen by Dr. Butts in November 2021. At the time, he was having some chest pain, and CT-CTA was ordered, but never completed.    In March 2022, he had PCI/ANA x 2 to the OM and LCx/OM by Dr. Peraza at [facility], and medical therapy was adjusted. He feels much better. He did complete cardiac rehab at [facility].    He does also have KRISTYN, and previously used a CPAP, but given health insurance change, the CPAP was taken back; he does have follow-up in December 2022 to see [facility] in [place] for KRISTYN evaluation and treatment.    He is here today for annual follow-up. He denies any overt chest pain, pressure or discomfort; no arm, jaw or neck pain; no palpitations and he does not feel his PVCs anymore. Occasional lightheadedness if he changes positions too quickly, but no syncope or presyncope; no LE edema. BP has been stable. He is compliant with his medications.    Past Medical History:   Diagnosis Date    Allergic rhinitis     Anesthesia     Prolonged nausea and vomiting    Arthritis     CAD (coronary artery disease) 03/10/2022    PCI/ANA x 2: OM (Resolute [place] 2.25 x 18mm) and LCx/OM (Resolute [place] 2.5 x 15mm) by Dr. Peraza.    Dental disorder     crown lower left    GERD (gastroesophageal reflux disease)     History of nephrolithiasis     Hypertriglyceridemia     LBP (low back pain)     MDD (major depressive disorder)     KRISTYN (obstructive sleep apnea)     Premature ventricular contractions (PVCs) (VPCs) 02/03/2012    Stress echocardiogram negative for ischemia, EF at rest 63%. Holter monitor with multiple interpolated PVCs, with occasional bigemy with pauses,  resolve with exercise.    Psychiatric problem     Depression    Renal disorder     History of kidney stones    Snoring      Past Surgical History:   Procedure Laterality Date    SHOULDER ARTHROSCOPY W/ BICIPITAL TENODESIS REPAIR  2/22/2012    Performed by STEVENSON BRUCE at [facility]    SHOULDER DECOMPRESSION  2/22/2012    Performed by STEVENSON BRUCE at [facility]    CASE CANCELLED  2/1/2012    Performed by STEVENSON BRUCE at [facility]    KNEE ARTHROSCOPY  11/12/2009    Performed by ANGELA INFANTE at [facility]    INGUINAL HERNIA REPAIR  2007    left    BICEPS TENDON REPAIR  2005    left    BICEPS TENDON REPAIR  2005    hardware removal    OTHER  1996    tonsillectomy    OTHER  1995    sinus surgery    ORIF, FRACTURE, HUMERUS  1990    left    ORIF, FRACTURE, HUMERUS  1990    hardware removal     Family History   Problem Relation Age of Onset    Heart Disease Mother     Heart Disease Father     Thyroid Sister     Cancer Maternal Grandmother         lung    Heart Disease Maternal Grandfather         smoker    Lung Disease Son         asthma    Arthritis Son         scoliosis     Social History     Socioeconomic History    Marital status:      Spouse name: Not on file    Number of children: Not on file    Years of education: Not on file    Highest education level: Not on file   Occupational History    Not on file   Tobacco Use    Smoking status: Never    Smokeless tobacco: Never   Vaping Use    Vaping Use: Never used   Substance and Sexual Activity    Alcohol use: No    Drug use: No    Sexual activity: Not Currently     Partners: Female     Comment:    Other Topics Concern    Not on file   Social History Narrative         Social Determinants of Health     Financial Resource Strain: Not on file   Food Insecurity: Not on file   Transportation Needs: Not on file   Physical Activity: Not on file   Stress: Not on file   Social Connections:  Not on file   Intimate Partner Violence: Not on file   Housing Stability: Not on file     Allergies   Allergen Reactions    Other Drug      Numbing eye gtt, eye swelling     Outpatient Encounter Medications as of 11/17/2022   Medication Sig Dispense Refill    clopidogrel (PLAVIX) 75 MG Tab Take 1 Tablet by mouth every day. 100 Tablet 3    metoprolol tartrate (LOPRESSOR) 25 MG Tab Take 0.5 Tablets by mouth 2 times a day. 100 Tablet 3    rosuvastatin (CRESTOR) 10 MG Tab Take 1 Tablet by mouth every evening. 100 Tablet 3    nitroglycerin (NITROSTAT) 0.4 MG SL Tab Place 1 Tablet under the tongue as needed for Chest Pain. 25 Tablet 1    aspirin EC (ECOTRIN) 81 MG Tablet Delayed Response Take 1 Tablet by mouth every day. 90 Tablet 3    escitalopram (LEXAPRO) 20 MG tablet TAKE 1 TABLET BY MOUTH DAILY      carbamazepine (CARBATROL) 300 MG CR capsule Take 1 Capsule by mouth 2 times a day.  6    ibuprofen (MOTRIN) 200 MG Tab Take 4 Tablets by mouth 2 times a day as needed for Mild Pain.      [DISCONTINUED] clopidogrel (PLAVIX) 75 MG Tab Take 1 Tablet by mouth every day.      [DISCONTINUED] rosuvastatin (CRESTOR) 10 MG Tab Take 1 Tablet by mouth every evening. 90 Tablet 3    [DISCONTINUED] nitroglycerin (NITROSTAT) 0.4 MG SL Tab Place 1 Tablet under the tongue as needed for Chest Pain. 25 Tablet 1    [DISCONTINUED] metoprolol tartrate (LOPRESSOR) 25 MG Tab Take 0.5 Tablets by mouth 2 times a day. 90 Tablet 3     No facility-administered encounter medications on file as of 11/17/2022.     Review of Systems   Constitutional:  Negative for chills and fever.   HENT:  Negative for congestion.    Respiratory:  Negative for cough and shortness of breath.    Cardiovascular:  Negative for chest pain, palpitations, orthopnea, leg swelling and PND.   Gastrointestinal:  Negative for abdominal pain and nausea.   Musculoskeletal:  Negative for myalgias.   Skin:  Negative for rash.   Neurological:  Negative for dizziness, loss of  "consciousness and headaches.   Endo/Heme/Allergies:  Does not bruise/bleed easily.   Psychiatric/Behavioral:  The patient does not have insomnia.             Objective     /82 (BP Location: Left arm, Patient Position: Sitting, BP Cuff Size: Adult)   Pulse 62   Resp 14   Ht 1.753 m (5' 9\")   Wt 110 kg (242 lb)   SpO2 97%   BMI 35.74 kg/m²     Physical Exam  Constitutional:       Appearance: He is well-developed.   HENT:      Head: Normocephalic.   Neck:      Vascular: No JVD.   Cardiovascular:      Rate and Rhythm: Normal rate and regular rhythm.      Heart sounds: Normal heart sounds.   Pulmonary:      Effort: Pulmonary effort is normal. No respiratory distress.      Breath sounds: Normal breath sounds. No wheezing or rales.   Abdominal:      General: Bowel sounds are normal. There is no distension.      Palpations: Abdomen is soft.      Tenderness: There is no abdominal tenderness.   Musculoskeletal:         General: Normal range of motion.      Cervical back: Normal range of motion and neck supple.   Skin:     General: Skin is warm and dry.      Findings: No rash.   Neurological:      Mental Status: He is alert and oriented to person, place, and time.   Psychiatric:         Mood and Affect: Mood normal.         Behavior: Behavior normal.     PROCEDURE OF 3/10/2022 ([facility]);   PCI/ANA x 2: OM (Resolute [place] 2.25 x 18mm) and LCx/OM (Resolute [place] 2.5 x 15mm) by Dr. Peraza.    CONCLUSIONS OF STRESS ECHOCARDIOGRAM OF 11/16/2021:  Appropriate augmentation of left ventricular function after maximal exercise with decrease in end-systolic dimensions.   Ischemic changes on ECG with stress persisting through recovery.   Hypertensive baseline.   Occasional PVCs. No sustained arrhythmias.   [facility] treadmill score +1.5; moderate risk.       Lab Results   Component Value Date/Time    SODIUM 142 11/16/2021 01:24 PM    POTASSIUM 4.1 11/16/2021 01:24 PM    CHLORIDE 106 11/16/2021 01:24 PM    CO2 20 11/16/2021 01:24 PM    " GLUCOSE 101 (H) 11/16/2021 01:24 PM    BUN 13 11/16/2021 01:24 PM    CREATININE 0.88 11/16/2021 01:24 PM      Lab Results   Component Value Date/Time    ASTSGOT 17 11/16/2021 01:24 PM    ALTSGPT 17 11/16/2021 01:24 PM       Lab Results   Component Value Date/Time    WBC 6.4 11/16/2021 01:24 PM    RBC 5.83 11/16/2021 01:24 PM    HEMOGLOBIN 16.5 11/16/2021 01:24 PM    HEMATOCRIT 48.3 11/16/2021 01:24 PM    MCV 82.8 11/16/2021 01:24 PM    MCH 28.3 11/16/2021 01:24 PM    MCHC 34.2 11/16/2021 01:24 PM    MPV 8.8 (L) 11/16/2021 01:24 PM         Assessment & Plan     1. Coronary artery disease involving native coronary artery of native heart without angina pectoris  clopidogrel (PLAVIX) 75 MG Tab    rosuvastatin (CRESTOR) 10 MG Tab    nitroglycerin (NITROSTAT) 0.4 MG SL Tab      2. Mixed hyperlipidemia  Comp Metabolic Panel    Lipid Profile    rosuvastatin (CRESTOR) 10 MG Tab      3. Premature ventricular contractions (PVCs) (VPCs)        4. KRISTYN (obstructive sleep apnea)            Medical Decision Making: Today's Assessment/Status/Plan:        CAD, status post PCI/ANA to the OM and LCx/OM in March 2022, feeling better. He can STOP Plavix in March 2023. Continue:  ASA 81mg once daily  Plavix 75mg once daily (to stop in March 2023)  Metoprolol 12.5mg twice daily  Crestor 10mg once daily  NTG 0.4mg SL PRN (rarely uses)    2. Hyperlipidemia, treated with Crestor 10mg. To check fasting lipid panel.    3. History of PVCs, improved with cardiac intervention.    4. KRISTYN, previously on CPAP, to be re-evaluated by pulmonology in December 2022.    Same medications for now; can stop Plavix in March 2023. Labs as above. Follow-up annually, unless clinical condition changes.

## 2022-12-20 ENCOUNTER — HOSPITAL ENCOUNTER (OUTPATIENT)
Dept: LAB | Facility: MEDICAL CENTER | Age: 52
End: 2022-12-20
Attending: INTERNAL MEDICINE
Payer: COMMERCIAL

## 2022-12-20 LAB
APPEARANCE UR: CLEAR
BASOPHILS # BLD AUTO: 1.4 % (ref 0–1.8)
BASOPHILS # BLD: 0.08 K/UL (ref 0–0.12)
BILIRUB UR QL STRIP.AUTO: NEGATIVE
COLOR UR: YELLOW
EOSINOPHIL # BLD AUTO: 0.35 K/UL (ref 0–0.51)
EOSINOPHIL NFR BLD: 6.1 % (ref 0–6.9)
ERYTHROCYTE [DISTWIDTH] IN BLOOD BY AUTOMATED COUNT: 36.7 FL (ref 35.9–50)
GLUCOSE UR STRIP.AUTO-MCNC: NEGATIVE MG/DL
HCT VFR BLD AUTO: 46.2 % (ref 42–52)
HGB BLD-MCNC: 15.5 G/DL (ref 14–18)
IMM GRANULOCYTES # BLD AUTO: 0.09 K/UL (ref 0–0.11)
IMM GRANULOCYTES NFR BLD AUTO: 1.6 % (ref 0–0.9)
KETONES UR STRIP.AUTO-MCNC: NEGATIVE MG/DL
LEUKOCYTE ESTERASE UR QL STRIP.AUTO: NEGATIVE
LYMPHOCYTES # BLD AUTO: 1.19 K/UL (ref 1–4.8)
LYMPHOCYTES NFR BLD: 20.8 % (ref 22–41)
MCH RBC QN AUTO: 27.9 PG (ref 27–33)
MCHC RBC AUTO-ENTMCNC: 33.5 G/DL (ref 33.7–35.3)
MCV RBC AUTO: 83.1 FL (ref 81.4–97.8)
MICRO URNS: NORMAL
MONOCYTES # BLD AUTO: 0.4 K/UL (ref 0–0.85)
MONOCYTES NFR BLD AUTO: 7 % (ref 0–13.4)
NEUTROPHILS # BLD AUTO: 3.62 K/UL (ref 1.82–7.42)
NEUTROPHILS NFR BLD: 63.1 % (ref 44–72)
NITRITE UR QL STRIP.AUTO: NEGATIVE
NRBC # BLD AUTO: 0 K/UL
NRBC BLD-RTO: 0 /100 WBC
PH UR STRIP.AUTO: 5.5 [PH] (ref 5–8)
PLATELET # BLD AUTO: 322 K/UL (ref 164–446)
PMV BLD AUTO: 8.9 FL (ref 9–12.9)
PROT UR QL STRIP: NEGATIVE MG/DL
RBC # BLD AUTO: 5.56 M/UL (ref 4.7–6.1)
RBC UR QL AUTO: NEGATIVE
SP GR UR STRIP.AUTO: 1.02
UROBILINOGEN UR STRIP.AUTO-MCNC: 0.2 MG/DL
WBC # BLD AUTO: 5.7 K/UL (ref 4.8–10.8)

## 2022-12-20 PROCEDURE — 80061 LIPID PANEL: CPT

## 2022-12-20 PROCEDURE — 80053 COMPREHEN METABOLIC PANEL: CPT

## 2022-12-20 PROCEDURE — 81003 URINALYSIS AUTO W/O SCOPE: CPT

## 2022-12-20 PROCEDURE — 36415 COLL VENOUS BLD VENIPUNCTURE: CPT

## 2022-12-20 PROCEDURE — 85025 COMPLETE CBC W/AUTO DIFF WBC: CPT

## 2022-12-20 PROCEDURE — 84153 ASSAY OF PSA TOTAL: CPT

## 2022-12-21 LAB
ALBUMIN SERPL BCP-MCNC: 4 G/DL (ref 3.2–4.9)
ALBUMIN/GLOB SERPL: 2.1 G/DL
ALP SERPL-CCNC: 96 U/L (ref 30–99)
ALT SERPL-CCNC: 40 U/L (ref 2–50)
ANION GAP SERPL CALC-SCNC: 10 MMOL/L (ref 7–16)
AST SERPL-CCNC: 25 U/L (ref 12–45)
BILIRUB SERPL-MCNC: 0.3 MG/DL (ref 0.1–1.5)
BUN SERPL-MCNC: 18 MG/DL (ref 8–22)
CALCIUM ALBUM COR SERPL-MCNC: 8.9 MG/DL (ref 8.5–10.5)
CALCIUM SERPL-MCNC: 8.9 MG/DL (ref 8.5–10.5)
CHLORIDE SERPL-SCNC: 108 MMOL/L (ref 96–112)
CHOLEST SERPL-MCNC: 122 MG/DL (ref 100–199)
CO2 SERPL-SCNC: 23 MMOL/L (ref 20–33)
CREAT SERPL-MCNC: 0.92 MG/DL (ref 0.5–1.4)
FASTING STATUS PATIENT QL REPORTED: NORMAL
GFR SERPLBLD CREATININE-BSD FMLA CKD-EPI: 100 ML/MIN/1.73 M 2
GLOBULIN SER CALC-MCNC: 1.9 G/DL (ref 1.9–3.5)
GLUCOSE SERPL-MCNC: 94 MG/DL (ref 65–99)
HDLC SERPL-MCNC: 28 MG/DL
LDLC SERPL CALC-MCNC: 54 MG/DL
POTASSIUM SERPL-SCNC: 4.7 MMOL/L (ref 3.6–5.5)
PROT SERPL-MCNC: 5.9 G/DL (ref 6–8.2)
PSA SERPL-MCNC: 0.38 NG/ML (ref 0–4)
SODIUM SERPL-SCNC: 141 MMOL/L (ref 135–145)
TRIGL SERPL-MCNC: 199 MG/DL (ref 0–149)

## 2022-12-24 ENCOUNTER — OFFICE VISIT (OUTPATIENT)
Dept: URGENT CARE | Facility: PHYSICIAN GROUP | Age: 52
End: 2022-12-24
Payer: COMMERCIAL

## 2022-12-24 VITALS
OXYGEN SATURATION: 96 % | HEART RATE: 79 BPM | BODY MASS INDEX: 35.84 KG/M2 | DIASTOLIC BLOOD PRESSURE: 80 MMHG | SYSTOLIC BLOOD PRESSURE: 120 MMHG | TEMPERATURE: 98.1 F | HEIGHT: 69 IN | WEIGHT: 242 LBS | RESPIRATION RATE: 16 BRPM

## 2022-12-24 DIAGNOSIS — U07.1 COVID-19 VIRUS INFECTION: ICD-10-CM

## 2022-12-24 DIAGNOSIS — R05.1 ACUTE COUGH: ICD-10-CM

## 2022-12-24 PROCEDURE — 99213 OFFICE O/P EST LOW 20 MIN: CPT | Performed by: FAMILY MEDICINE

## 2022-12-24 RX ORDER — PREDNISONE 20 MG/1
TABLET ORAL
Qty: 14 TABLET | Refills: 0 | Status: SHIPPED | OUTPATIENT
Start: 2022-12-24 | End: 2023-05-27

## 2022-12-24 ASSESSMENT — FIBROSIS 4 INDEX
FIB4 SCORE: 0.64
FIB4 SCORE: 0.64

## 2022-12-24 NOTE — PROGRESS NOTES
Chief Complaint:    Chief Complaint   Patient presents with    Cough     Tested positive on the 15th or 16th  No taste of smell       History of Present Illness:    Wife present. Tested positive for Covid on 12/15 or 12/16/22. For about 3 days prior to testing positive, he had nasal symptoms with purulent mucus from nose. The nasal symptoms improved and he is no longer having purulent mucus from nose. Still coughing a lot, not productive of purulent mucus. No recent fever. Recently lost taste and smell. Today, tested positive for Covid again. Never had Covid prior to this month.      Past Medical History:    Past Medical History:   Diagnosis Date    Allergic rhinitis     Anesthesia     Prolonged nausea and vomiting    Arthritis     CAD (coronary artery disease) 03/10/2022    PCI/ANA x 2: OM (Resolute [place] 2.25 x 18mm) and LCx/OM (Resolute Will 2.5 x 15mm) by Dr. Peraza.    Dental disorder     crown lower left    GERD (gastroesophageal reflux disease)     History of nephrolithiasis     Hypertriglyceridemia     LBP (low back pain)     MDD (major depressive disorder)     KRISTYN (obstructive sleep apnea)     Premature ventricular contractions (PVCs) (VPCs) 02/03/2012    Stress echocardiogram negative for ischemia, EF at rest 63%. Holter monitor with multiple interpolated PVCs, with occasional bigemy with pauses, resolve with exercise.    Psychiatric problem     Depression    Renal disorder     History of kidney stones    Snoring      Past Surgical History:    Past Surgical History:   Procedure Laterality Date    SHOULDER ARTHROSCOPY W/ BICIPITAL TENODESIS REPAIR  2/22/2012    Performed by STEVENSON BRUCE at [facility]    SHOULDER DECOMPRESSION  2/22/2012    Performed by STEVENSON BRUCE at [facility]    CASE CANCELLED  2/1/2012    Performed by STEVENSON BRUCE at [facility]    KNEE ARTHROSCOPY  11/12/2009    Performed by ANGELA INFANTE at [facility]    INGUINAL HERNIA  REPAIR  2007    left    BICEPS TENDON REPAIR  2005    left    BICEPS TENDON REPAIR  2005    hardware removal    OTHER  1996    tonsillectomy    OTHER  1995    sinus surgery    ORIF, FRACTURE, HUMERUS  1990    left    ORIF, FRACTURE, HUMERUS  1990    hardware removal     Social History:    Social History     Socioeconomic History    Marital status:      Spouse name: Not on file    Number of children: Not on file    Years of education: Not on file    Highest education level: Not on file   Occupational History    Not on file   Tobacco Use    Smoking status: Never    Smokeless tobacco: Never   Vaping Use    Vaping Use: Never used   Substance and Sexual Activity    Alcohol use: No    Drug use: No    Sexual activity: Not Currently     Partners: Female     Comment:    Other Topics Concern    Not on file   Social History Narrative         Social Determinants of Health     Financial Resource Strain: Not on file   Food Insecurity: Not on file   Transportation Needs: Not on file   Physical Activity: Not on file   Stress: Not on file   Social Connections: Not on file   Intimate Partner Violence: Not on file   Housing Stability: Not on file     Family History:    Family History   Problem Relation Age of Onset    Heart Disease Mother     Heart Disease Father     Thyroid Sister     Cancer Maternal Grandmother         lung    Heart Disease Maternal Grandfather         smoker    Lung Disease Son         asthma    Arthritis Son         scoliosis     Medications:    Current Outpatient Medications on File Prior to Visit   Medication Sig Dispense Refill    clopidogrel (PLAVIX) 75 MG Tab Take 1 Tablet by mouth every day. 100 Tablet 3    metoprolol tartrate (LOPRESSOR) 25 MG Tab Take 0.5 Tablets by mouth 2 times a day. 100 Tablet 3    rosuvastatin (CRESTOR) 10 MG Tab Take 1 Tablet by mouth every evening. 100 Tablet 3    nitroglycerin (NITROSTAT) 0.4 MG SL Tab Place 1 Tablet under the tongue as needed for Chest  "Pain. 25 Tablet 1    aspirin EC (ECOTRIN) 81 MG Tablet Delayed Response Take 1 Tablet by mouth every day. 90 Tablet 3    escitalopram (LEXAPRO) 20 MG tablet TAKE 1 TABLET BY MOUTH DAILY      carbamazepine (CARBATROL) 300 MG CR capsule Take 1 Capsule by mouth 2 times a day.  6    ibuprofen (MOTRIN) 200 MG Tab Take 4 Tablets by mouth 2 times a day as needed for Mild Pain.       No current facility-administered medications on file prior to visit.     Allergies:    Allergies   Allergen Reactions    Other Drug      Numbing eye gtt, eye swelling       Vitals:    Vitals:    22 1407 22 1408   BP:  120/80   Pulse:  79   Resp:  16   Temp:  36.7 °C (98.1 °F)   TempSrc:  Temporal   SpO2:  96%   Weight: 110 kg (242 lb) 110 kg (242 lb)   Height: 1.753 m (5' 9\") 1.753 m (5' 9\")       Physical Exam:    Constitutional: Vital signs reviewed. Appears well-developed and well-nourished. No acute distress.   Eyes: Sclera white, conjunctivae clear.   ENT: External ears normal. Hearing normal.   Neck: Neck supple.   Cardiovascular: Regular rate and rhythm. No murmur.  Pulmonary/Chest: Respirations non-labored. Clear to auscultation bilaterally.  Musculoskeletal: Normal gait. No muscular atrophy or weakness.  Neurological: Alert and oriented to person, place, and time. Muscle tone normal. Coordination normal.   Skin: No rashes or lesions. Warm, dry, normal turgor.  Psychiatric: Normal mood and affect. Behavior is normal. Judgment and thought content normal.       Medical Decision Makin. COVID-19 virus infection  - molnupiravir 200 MG capsule; Take 4 Capsules by mouth 2 times a day for 5 days.  Dispense: 40 Capsule; Refill: 0    2. Acute cough  - predniSONE (DELTASONE) 20 MG Tab; 1 TAB BY MOUTH ONCE ONLY IF NEEDED FOR COUGH. TAKE WITH FOOD.  Dispense: 14 Tablet; Refill: 0       Discussed with them DDX, management options, and risks, benefits, and alternatives to treatment plan agreed upon.    Wife present. Tested positive " for Covid on 12/15 or 12/16/22. For about 3 days prior to testing positive, he had nasal symptoms with purulent mucus from nose. The nasal symptoms improved and he is no longer having purulent mucus from nose. Still coughing a lot, not productive of purulent mucus. No recent fever. Recently lost taste and smell. Today, tested positive for Covid again. Never had Covid prior to this month.    Advised antiviral treatment for Covid may not be effective at this time due to timing of first positive Covid test. He is interested in taking antiviral treatment, understands may not be effective. Paxlovid interacts with at least Carbamazepine and possibly some of his other meds. Thus will use Molnupiravir.    Advised cough can persist for many weeks and months even after recovering from Covid. Steroid can help cough sometimes. Advised decreased smell and taste can also last for many weeks or months.    Agreeable to medications prescribed.    Discussed expected course of duration, time for improvement, and to seek follow-up in Emergency Room, urgent care, or with PCP if getting worse at any time or not improving within expected time frame.

## 2022-12-31 ENCOUNTER — OFFICE VISIT (OUTPATIENT)
Dept: URGENT CARE | Facility: PHYSICIAN GROUP | Age: 52
End: 2022-12-31
Payer: COMMERCIAL

## 2022-12-31 VITALS
RESPIRATION RATE: 20 BRPM | TEMPERATURE: 97.5 F | BODY MASS INDEX: 36.14 KG/M2 | HEART RATE: 71 BPM | OXYGEN SATURATION: 97 % | DIASTOLIC BLOOD PRESSURE: 80 MMHG | WEIGHT: 244 LBS | SYSTOLIC BLOOD PRESSURE: 120 MMHG | HEIGHT: 69 IN

## 2022-12-31 DIAGNOSIS — R05.1 ACUTE COUGH: ICD-10-CM

## 2022-12-31 DIAGNOSIS — J01.90 ACUTE BACTERIAL SINUSITIS: ICD-10-CM

## 2022-12-31 DIAGNOSIS — B96.89 ACUTE BACTERIAL SINUSITIS: ICD-10-CM

## 2022-12-31 PROCEDURE — 99213 OFFICE O/P EST LOW 20 MIN: CPT | Performed by: FAMILY MEDICINE

## 2022-12-31 RX ORDER — AMOXICILLIN AND CLAVULANATE POTASSIUM 875; 125 MG/1; MG/1
TABLET, FILM COATED ORAL
Qty: 20 TABLET | Refills: 0 | Status: SHIPPED | OUTPATIENT
Start: 2022-12-31 | End: 2023-01-10

## 2022-12-31 RX ORDER — DEXTROMETHORPHAN HYDROBROMIDE AND PROMETHAZINE HYDROCHLORIDE 15; 6.25 MG/5ML; MG/5ML
5 SYRUP ORAL EVERY 6 HOURS PRN
Qty: 140 ML | Refills: 0 | Status: SHIPPED | OUTPATIENT
Start: 2022-12-31 | End: 2023-01-07

## 2022-12-31 ASSESSMENT — FIBROSIS 4 INDEX: FIB4 SCORE: 0.64

## 2022-12-31 NOTE — PROGRESS NOTES
Chief Complaint:    Chief Complaint   Patient presents with    Congestion     Blew nose after visit last week, heard ear pop and felt dizzy and now he has sinus pressure with yellow/green discharge    Cough     Had covid positive at home test on 12/23       History of Present Illness:    On 12/27/22, blew nose, ear popped, got dizzy, and since then has developed nasal symptoms with purulent mucus from nose and coughing fits at night (possibly due to post-nasal drainage). Prednisone 20 mg rx'd 12/24/22 have been helping symptoms some. Augmentin has worked and been tolerated for previous sinus infection.      Past Medical History:    Past Medical History:   Diagnosis Date    Allergic rhinitis     Anesthesia     Prolonged nausea and vomiting    Arthritis     CAD (coronary artery disease) 03/10/2022    PCI/ANA x 2: OM ([facility] [place] 2.25 x 18mm) and LCx/OM ([facility] [place] 2.5 x 15mm) by Dr. Peraza.    Dental disorder     crown lower left    GERD (gastroesophageal reflux disease)     History of nephrolithiasis     Hypertriglyceridemia     LBP (low back pain)     MDD (major depressive disorder)     KRISTYN (obstructive sleep apnea)     Premature ventricular contractions (PVCs) (VPCs) 02/03/2012    Stress echocardiogram negative for ischemia, EF at rest 63%. Holter monitor with multiple interpolated PVCs, with occasional bigemy with pauses, resolve with exercise.    Psychiatric problem     Depression    Renal disorder     History of kidney stones    Snoring      Past Surgical History:    Past Surgical History:   Procedure Laterality Date    SHOULDER ARTHROSCOPY W/ BICIPITAL TENODESIS REPAIR  2/22/2012    Performed by STEVENSON BRUCE at SURGERY [facility] ORS    SHOULDER DECOMPRESSION  2/22/2012    Performed by STEVENSON BRUCE at SURGERY [facility] ORS    CASE CANCELLED  2/1/2012    Performed by STEVENSON BRUCE at SURGERY [facility] ORS    KNEE ARTHROSCOPY  11/12/2009    Performed by ANGELA INFANTE at SURGERY [facility] ORS     INGUINAL HERNIA REPAIR  2007    left    BICEPS TENDON REPAIR  2005    left    BICEPS TENDON REPAIR  2005    hardware removal    OTHER  1996    tonsillectomy    OTHER  1995    sinus surgery    ORIF, FRACTURE, HUMERUS  1990    left    ORIF, FRACTURE, HUMERUS  1990    hardware removal     Social History:    Social History     Socioeconomic History    Marital status:      Spouse name: Not on file    Number of children: Not on file    Years of education: Not on file    Highest education level: Not on file   Occupational History    Not on file   Tobacco Use    Smoking status: Never    Smokeless tobacco: Never   Vaping Use    Vaping Use: Never used   Substance and Sexual Activity    Alcohol use: No    Drug use: No    Sexual activity: Not Currently     Partners: Female     Comment:    Other Topics Concern    Not on file   Social History Narrative         Social Determinants of Health     Financial Resource Strain: Not on file   Food Insecurity: Not on file   Transportation Needs: Not on file   Physical Activity: Not on file   Stress: Not on file   Social Connections: Not on file   Intimate Partner Violence: Not on file   Housing Stability: Not on file     Family History:    Family History   Problem Relation Age of Onset    Heart Disease Mother     Heart Disease Father     Thyroid Sister     Cancer Maternal Grandmother         lung    Heart Disease Maternal Grandfather         smoker    Lung Disease Son         asthma    Arthritis Son         scoliosis     Medications:    Current Outpatient Medications on File Prior to Visit   Medication Sig Dispense Refill    predniSONE (DELTASONE) 20 MG Tab 1 TAB BY MOUTH ONCE ONLY IF NEEDED FOR COUGH. TAKE WITH FOOD. 14 Tablet 0    clopidogrel (PLAVIX) 75 MG Tab Take 1 Tablet by mouth every day. 100 Tablet 3    metoprolol tartrate (LOPRESSOR) 25 MG Tab Take 0.5 Tablets by mouth 2 times a day. 100 Tablet 3    rosuvastatin (CRESTOR) 10 MG Tab Take 1 Tablet by  "mouth every evening. 100 Tablet 3    nitroglycerin (NITROSTAT) 0.4 MG SL Tab Place 1 Tablet under the tongue as needed for Chest Pain. 25 Tablet 1    aspirin EC (ECOTRIN) 81 MG Tablet Delayed Response Take 1 Tablet by mouth every day. 90 Tablet 3    escitalopram (LEXAPRO) 20 MG tablet TAKE 1 TABLET BY MOUTH DAILY      carbamazepine (CARBATROL) 300 MG CR capsule Take 1 Capsule by mouth 2 times a day.  6    ibuprofen (MOTRIN) 200 MG Tab Take 4 Tablets by mouth 2 times a day as needed for Mild Pain.       No current facility-administered medications on file prior to visit.     Allergies:    Allergies   Allergen Reactions    Other Drug      Numbing eye gtt, eye swelling       Vitals:    Vitals:    22 0911   BP: 120/80   Pulse: 71   Resp: 20   Temp: 36.4 °C (97.5 °F)   TempSrc: Temporal   SpO2: 97%   Weight: 111 kg (244 lb)   Height: 1.753 m (5' 9\")       Physical Exam:    Constitutional: Vital signs reviewed. Appears well-developed and well-nourished. No acute distress.   Eyes: Sclera white, conjunctivae clear. PERRLA.  ENT: TTP bilateral maxillary sinus regions. External ears normal. External auditory canals normal without discharge. TMs translucent and non-bulging. Hearing normal. Lips/teeth are normal. Oral mucosa pink and moist. Posterior pharynx: WNL.  Neck: Neck supple.   Cardiovascular: Regular rate and rhythm. No murmur.  Pulmonary/Chest: Respirations non-labored. Clear to auscultation bilaterally.  Musculoskeletal: Normal gait. No muscular atrophy or weakness.  Neurological: Alert and oriented to person, place, and time. CN 2-12 intact. Muscle tone normal. Coordination normal.   Skin: No rashes or lesions. Warm, dry, normal turgor.  Psychiatric: Normal mood and affect. Behavior is normal. Judgment and thought content normal.       Medical Decision Makin. Acute bacterial sinusitis  - amoxicillin-clavulanate (AUGMENTIN) 875-125 MG Tab; 1 TAB BY MOUTH TWICE A DAY X 10 DAYS. TAKE WITH FOOD.  Dispense: " 20 Tablet; Refill: 0    2. Acute cough  - promethazine-dextromethorphan (PROMETHAZINE-DM) 6.25-15 MG/5ML syrup; Take 5 mL by mouth every 6 hours as needed for Cough for up to 7 days. May cause drowsiness.  Dispense: 140 mL; Refill: 0       Discussed with him DDX, management options, and risks, benefits, and alternatives to treatment plan agreed upon.    On 12/27/22, blew nose, ear popped, got dizzy, and since then has developed nasal symptoms with purulent mucus from nose and coughing fits at night (possibly due to post-nasal drainage). Prednisone 20 mg rx'd 12/24/22 have been helping symptoms some. Augmentin has worked and been tolerated for previous sinus infection.    TTP bilateral maxillary sinus regions.    May continue with Prednisone as needed for symptoms.    Agreeable to medications prescribed.    Discussed expected course of duration, time for improvement, and to seek follow-up in Emergency Room, urgent care, or with PCP if getting worse at any time or not improving within expected time frame.

## 2023-05-27 ENCOUNTER — HOSPITAL ENCOUNTER (OUTPATIENT)
Facility: MEDICAL CENTER | Age: 53
End: 2023-05-28
Attending: EMERGENCY MEDICINE | Admitting: INTERNAL MEDICINE
Payer: COMMERCIAL

## 2023-05-27 ENCOUNTER — APPOINTMENT (OUTPATIENT)
Dept: RADIOLOGY | Facility: MEDICAL CENTER | Age: 53
End: 2023-05-27
Attending: EMERGENCY MEDICINE
Payer: COMMERCIAL

## 2023-05-27 ENCOUNTER — APPOINTMENT (OUTPATIENT)
Dept: CARDIOLOGY | Facility: MEDICAL CENTER | Age: 53
End: 2023-05-27
Payer: COMMERCIAL

## 2023-05-27 DIAGNOSIS — R07.9 CHEST PAIN, UNSPECIFIED TYPE: ICD-10-CM

## 2023-05-27 PROBLEM — R56.9 SEIZURE (HCC): Status: ACTIVE | Noted: 2023-05-27

## 2023-05-27 LAB
ALBUMIN SERPL BCP-MCNC: 3.9 G/DL (ref 3.2–4.9)
ALBUMIN/GLOB SERPL: 1.9 G/DL
ALP SERPL-CCNC: 124 U/L (ref 30–99)
ALT SERPL-CCNC: 19 U/L (ref 2–50)
ANION GAP SERPL CALC-SCNC: 11 MMOL/L (ref 7–16)
AST SERPL-CCNC: 15 U/L (ref 12–45)
BASOPHILS # BLD AUTO: 1.2 % (ref 0–1.8)
BASOPHILS # BLD: 0.09 K/UL (ref 0–0.12)
BILIRUB SERPL-MCNC: 0.3 MG/DL (ref 0.1–1.5)
BUN SERPL-MCNC: 12 MG/DL (ref 8–22)
CALCIUM ALBUM COR SERPL-MCNC: 9.3 MG/DL (ref 8.5–10.5)
CALCIUM SERPL-MCNC: 9.2 MG/DL (ref 8.5–10.5)
CHLORIDE SERPL-SCNC: 108 MMOL/L (ref 96–112)
CO2 SERPL-SCNC: 24 MMOL/L (ref 20–33)
CREAT SERPL-MCNC: 0.93 MG/DL (ref 0.5–1.4)
EKG IMPRESSION: NORMAL
EKG IMPRESSION: NORMAL
EOSINOPHIL # BLD AUTO: 0.17 K/UL (ref 0–0.51)
EOSINOPHIL NFR BLD: 2.3 % (ref 0–6.9)
ERYTHROCYTE [DISTWIDTH] IN BLOOD BY AUTOMATED COUNT: 35.7 FL (ref 35.9–50)
GFR SERPLBLD CREATININE-BSD FMLA CKD-EPI: 98 ML/MIN/1.73 M 2
GLOBULIN SER CALC-MCNC: 2.1 G/DL (ref 1.9–3.5)
GLUCOSE SERPL-MCNC: 124 MG/DL (ref 65–99)
HCT VFR BLD AUTO: 46.9 % (ref 42–52)
HGB BLD-MCNC: 15.9 G/DL (ref 14–18)
IMM GRANULOCYTES # BLD AUTO: 0.09 K/UL (ref 0–0.11)
IMM GRANULOCYTES NFR BLD AUTO: 1.2 % (ref 0–0.9)
LYMPHOCYTES # BLD AUTO: 1.4 K/UL (ref 1–4.8)
LYMPHOCYTES NFR BLD: 19.1 % (ref 22–41)
MAGNESIUM SERPL-MCNC: 2.1 MG/DL (ref 1.5–2.5)
MCH RBC QN AUTO: 27.3 PG (ref 27–33)
MCHC RBC AUTO-ENTMCNC: 33.9 G/DL (ref 32.3–36.5)
MCV RBC AUTO: 80.6 FL (ref 81.4–97.8)
MONOCYTES # BLD AUTO: 0.62 K/UL (ref 0–0.85)
MONOCYTES NFR BLD AUTO: 8.4 % (ref 0–13.4)
NEUTROPHILS # BLD AUTO: 4.97 K/UL (ref 1.82–7.42)
NEUTROPHILS NFR BLD: 67.8 % (ref 44–72)
NRBC # BLD AUTO: 0 K/UL
NRBC BLD-RTO: 0 /100 WBC (ref 0–0.2)
PLATELET # BLD AUTO: 284 K/UL (ref 164–446)
PMV BLD AUTO: 8.6 FL (ref 9–12.9)
POTASSIUM SERPL-SCNC: 4 MMOL/L (ref 3.6–5.5)
PROT SERPL-MCNC: 6 G/DL (ref 6–8.2)
RBC # BLD AUTO: 5.82 M/UL (ref 4.7–6.1)
SODIUM SERPL-SCNC: 143 MMOL/L (ref 135–145)
TROPONIN T SERPL-MCNC: 8 NG/L (ref 6–19)
TROPONIN T SERPL-MCNC: 8 NG/L (ref 6–19)
TROPONIN T SERPL-MCNC: 9 NG/L (ref 6–19)
WBC # BLD AUTO: 7.3 K/UL (ref 4.8–10.8)

## 2023-05-27 PROCEDURE — A9270 NON-COVERED ITEM OR SERVICE: HCPCS

## 2023-05-27 PROCEDURE — 93005 ELECTROCARDIOGRAM TRACING: CPT

## 2023-05-27 PROCEDURE — 36415 COLL VENOUS BLD VENIPUNCTURE: CPT

## 2023-05-27 PROCEDURE — 84484 ASSAY OF TROPONIN QUANT: CPT

## 2023-05-27 PROCEDURE — 99223 1ST HOSP IP/OBS HIGH 75: CPT | Mod: GC | Performed by: INTERNAL MEDICINE

## 2023-05-27 PROCEDURE — 71045 X-RAY EXAM CHEST 1 VIEW: CPT

## 2023-05-27 PROCEDURE — G0378 HOSPITAL OBSERVATION PER HR: HCPCS

## 2023-05-27 PROCEDURE — 93005 ELECTROCARDIOGRAM TRACING: CPT | Performed by: EMERGENCY MEDICINE

## 2023-05-27 PROCEDURE — A9270 NON-COVERED ITEM OR SERVICE: HCPCS | Performed by: EMERGENCY MEDICINE

## 2023-05-27 PROCEDURE — 80053 COMPREHEN METABOLIC PANEL: CPT

## 2023-05-27 PROCEDURE — 700102 HCHG RX REV CODE 250 W/ 637 OVERRIDE(OP)

## 2023-05-27 PROCEDURE — 99285 EMERGENCY DEPT VISIT HI MDM: CPT

## 2023-05-27 PROCEDURE — 93306 TTE W/DOPPLER COMPLETE: CPT

## 2023-05-27 PROCEDURE — 700102 HCHG RX REV CODE 250 W/ 637 OVERRIDE(OP): Performed by: EMERGENCY MEDICINE

## 2023-05-27 PROCEDURE — 96374 THER/PROPH/DIAG INJ IV PUSH: CPT

## 2023-05-27 PROCEDURE — 700111 HCHG RX REV CODE 636 W/ 250 OVERRIDE (IP): Performed by: EMERGENCY MEDICINE

## 2023-05-27 PROCEDURE — 85025 COMPLETE CBC W/AUTO DIFF WBC: CPT

## 2023-05-27 PROCEDURE — 83735 ASSAY OF MAGNESIUM: CPT

## 2023-05-27 RX ORDER — ATORVASTATIN CALCIUM 80 MG/1
80 TABLET, FILM COATED ORAL EVERY EVENING
Status: DISCONTINUED | OUTPATIENT
Start: 2023-05-27 | End: 2023-05-28 | Stop reason: HOSPADM

## 2023-05-27 RX ORDER — BISACODYL 10 MG
10 SUPPOSITORY, RECTAL RECTAL
Status: DISCONTINUED | OUTPATIENT
Start: 2023-05-27 | End: 2023-05-28 | Stop reason: HOSPADM

## 2023-05-27 RX ORDER — ENOXAPARIN SODIUM 100 MG/ML
40 INJECTION SUBCUTANEOUS DAILY
Status: DISCONTINUED | OUTPATIENT
Start: 2023-05-27 | End: 2023-05-28 | Stop reason: HOSPADM

## 2023-05-27 RX ORDER — LORATADINE 10 MG/1
10 TABLET ORAL DAILY
Status: DISCONTINUED | OUTPATIENT
Start: 2023-05-28 | End: 2023-05-28 | Stop reason: HOSPADM

## 2023-05-27 RX ORDER — ASPIRIN 81 MG/1
81 TABLET ORAL DAILY
Status: DISCONTINUED | OUTPATIENT
Start: 2023-05-28 | End: 2023-05-28 | Stop reason: HOSPADM

## 2023-05-27 RX ORDER — ONDANSETRON 2 MG/ML
4 INJECTION INTRAMUSCULAR; INTRAVENOUS ONCE
Status: COMPLETED | OUTPATIENT
Start: 2023-05-27 | End: 2023-05-27

## 2023-05-27 RX ORDER — ESCITALOPRAM OXALATE 10 MG/1
20 TABLET ORAL DAILY
Status: DISCONTINUED | OUTPATIENT
Start: 2023-05-28 | End: 2023-05-28 | Stop reason: HOSPADM

## 2023-05-27 RX ORDER — NITROGLYCERIN 0.4 MG/1
0.4 TABLET SUBLINGUAL PRN
Status: DISCONTINUED | OUTPATIENT
Start: 2023-05-27 | End: 2023-05-28 | Stop reason: HOSPADM

## 2023-05-27 RX ORDER — ALBUTEROL SULFATE 90 UG/1
2 AEROSOL, METERED RESPIRATORY (INHALATION) EVERY 4 HOURS PRN
COMMUNITY

## 2023-05-27 RX ORDER — FLUTICASONE PROPIONATE 50 MCG
1 SPRAY, SUSPENSION (ML) NASAL DAILY
Status: DISCONTINUED | OUTPATIENT
Start: 2023-05-27 | End: 2023-05-27

## 2023-05-27 RX ORDER — CARBAMAZEPINE 100 MG/1
300 TABLET, EXTENDED RELEASE ORAL 2 TIMES DAILY
Status: DISCONTINUED | OUTPATIENT
Start: 2023-05-27 | End: 2023-05-28 | Stop reason: HOSPADM

## 2023-05-27 RX ORDER — POLYETHYLENE GLYCOL 3350 17 G/17G
1 POWDER, FOR SOLUTION ORAL
Status: DISCONTINUED | OUTPATIENT
Start: 2023-05-27 | End: 2023-05-28 | Stop reason: HOSPADM

## 2023-05-27 RX ORDER — FLUTICASONE PROPIONATE 50 MCG
1 SPRAY, SUSPENSION (ML) NASAL DAILY
COMMUNITY

## 2023-05-27 RX ORDER — ALBUTEROL SULFATE 90 UG/1
2 AEROSOL, METERED RESPIRATORY (INHALATION) EVERY 4 HOURS PRN
Status: DISCONTINUED | OUTPATIENT
Start: 2023-05-27 | End: 2023-05-28 | Stop reason: HOSPADM

## 2023-05-27 RX ORDER — LORATADINE 10 MG/1
10 TABLET ORAL DAILY
COMMUNITY

## 2023-05-27 RX ORDER — AMOXICILLIN 250 MG
2 CAPSULE ORAL 2 TIMES DAILY
Status: DISCONTINUED | OUTPATIENT
Start: 2023-05-27 | End: 2023-05-28 | Stop reason: HOSPADM

## 2023-05-27 RX ORDER — MORPHINE SULFATE 4 MG/ML
2 INJECTION INTRAVENOUS ONCE
Status: DISPENSED | OUTPATIENT
Start: 2023-05-27 | End: 2023-05-28

## 2023-05-27 RX ORDER — ASPIRIN 81 MG/1
324 TABLET, CHEWABLE ORAL ONCE
Status: COMPLETED | OUTPATIENT
Start: 2023-05-27 | End: 2023-05-27

## 2023-05-27 RX ADMIN — CARBAMAZEPINE 300 MG: 100 TABLET, EXTENDED RELEASE ORAL at 18:39

## 2023-05-27 RX ADMIN — ASPIRIN 81 MG 324 MG: 81 TABLET ORAL at 12:39

## 2023-05-27 RX ADMIN — METOPROLOL TARTRATE 12.5 MG: 25 TABLET, FILM COATED ORAL at 18:34

## 2023-05-27 RX ADMIN — ONDANSETRON 4 MG: 2 INJECTION INTRAMUSCULAR; INTRAVENOUS at 12:40

## 2023-05-27 RX ADMIN — ATORVASTATIN CALCIUM 80 MG: 80 TABLET, FILM COATED ORAL at 18:34

## 2023-05-27 ASSESSMENT — LIFESTYLE VARIABLES
HAVE YOU EVER FELT YOU SHOULD CUT DOWN ON YOUR DRINKING: NO
ON A TYPICAL DAY WHEN YOU DRINK ALCOHOL HOW MANY DRINKS DO YOU HAVE: 0
DOES PATIENT WANT TO STOP DRINKING: NO
AVERAGE NUMBER OF DAYS PER WEEK YOU HAVE A DRINK CONTAINING ALCOHOL: 0
EVER FELT BAD OR GUILTY ABOUT YOUR DRINKING: NO
TOTAL SCORE: 0
TOTAL SCORE: 0
HOW MANY TIMES IN THE PAST YEAR HAVE YOU HAD 5 OR MORE DRINKS IN A DAY: 0
TOTAL SCORE: 0
HAVE PEOPLE ANNOYED YOU BY CRITICIZING YOUR DRINKING: NO
ALCOHOL_USE: NO
EVER HAD A DRINK FIRST THING IN THE MORNING TO STEADY YOUR NERVES TO GET RID OF A HANGOVER: NO
CONSUMPTION TOTAL: NEGATIVE

## 2023-05-27 ASSESSMENT — PATIENT HEALTH QUESTIONNAIRE - PHQ9
SUM OF ALL RESPONSES TO PHQ9 QUESTIONS 1 AND 2: 0
2. FEELING DOWN, DEPRESSED, IRRITABLE, OR HOPELESS: NOT AT ALL
1. LITTLE INTEREST OR PLEASURE IN DOING THINGS: NOT AT ALL

## 2023-05-27 ASSESSMENT — ENCOUNTER SYMPTOMS
HEADACHES: 1
DIZZINESS: 1
SHORTNESS OF BREATH: 1
TINGLING: 1

## 2023-05-27 ASSESSMENT — COPD QUESTIONNAIRES
DURING THE PAST 4 WEEKS HOW MUCH DID YOU FEEL SHORT OF BREATH: NONE/LITTLE OF THE TIME
DO YOU EVER COUGH UP ANY MUCUS OR PHLEGM?: NO/ONLY WITH OCCASIONAL COLDS OR INFECTIONS
COPD SCREENING SCORE: 1
HAVE YOU SMOKED AT LEAST 100 CIGARETTES IN YOUR ENTIRE LIFE: NO/DON'T KNOW

## 2023-05-27 ASSESSMENT — FIBROSIS 4 INDEX
FIB4 SCORE: 0.64
FIB4 SCORE: 0.65

## 2023-05-27 NOTE — H&P
MEDICAL STUDENT HISTORY AND PHYSICAL     PATIENT ID:  NAME:  Domenic Glover  MRN:               [id number]  YOB: 1970    Date of Admission: 5/27/2023     Attending: Dr. Cool    Resident: Dr. Arben HENSLEY (PGY 2) Dr. Abisai HENSLEY (PGY 1)    Primary Care Physician:  Moses Martinez M.D.    CC:  Chest pain    HPI: Domenic Glover is a 53 y.o. male who presented with 1-3/10 chest pain since 0800 today (7hrs) he describes the pain as dull and retrosternal/left chest with associated left arm tingling sensation. He has had these symptoms frequently, with no associated triggers to onset, and no way to alleviate symptoms but usually they resolve on their own after 10 minutes. This episode of chest pain has also been associated with a headache. He does have a past medical history of 3 coronary stents placed in march of 2022 at [facility], but records are currently unavailable for review. He has been taking his metoprolol 12.5mg, rosuvastatin at 10mg, and took his plavix for the appropriate 1 year duration follow stent placement, but stopped aspirin 81 at the same time.     He also takes carbamazepine for seizure like twitching that effects his eye, and escitalopram for anxiety.    REVIEW OF SYSTEMS:   Ten systems reviewed and were negative except as noted in the HPI.                PAST MEDICAL HISTORY:  Past Medical History:   Diagnosis Date    Allergic rhinitis     Anesthesia     Prolonged nausea and vomiting    Arthritis     CAD (coronary artery disease) 03/10/2022    PCI/ANA x 2: OM ([facility] [place] 2.25 x 18mm) and LCx/OM ([facility] [place] 2.5 x 15mm) by Dr. Peraza.    Dental disorder     crown lower left    GERD (gastroesophageal reflux disease)     History of nephrolithiasis     Hypertriglyceridemia     LBP (low back pain)     MDD (major depressive disorder)     KRISTYN (obstructive sleep apnea)     Premature ventricular contractions (PVCs) (VPCs) 02/03/2012    Stress echocardiogram negative for  ischemia, EF at rest 63%. Holter monitor with multiple interpolated PVCs, with occasional bigemy with pauses, resolve with exercise.    Psychiatric problem     Depression    Renal disorder     History of kidney stones    Snoring        PAST SURGICAL HISTORY:  Past Surgical History:   Procedure Laterality Date    SHOULDER ARTHROSCOPY W/ BICIPITAL TENODESIS REPAIR  2/22/2012    Performed by STEVENSON BRUCE at SURGERY [facility] ORS    SHOULDER DECOMPRESSION  2/22/2012    Performed by STEVENSON BRUCE at SURGERY [facility] ORS    CASE CANCELLED  2/1/2012    Performed by STEVENSON BRUCE at SURGERY [facility] ORS    KNEE ARTHROSCOPY  11/12/2009    Performed by ANGELA INFANTE at SURGERY [facility] ORS    INGUINAL HERNIA REPAIR  2007    left    BICEPS TENDON REPAIR  2005    left    BICEPS TENDON REPAIR  2005    hardware removal    OTHER  1996    tonsillectomy    OTHER  1995    sinus surgery    ORIF, FRACTURE, HUMERUS  1990    left    ORIF, FRACTURE, HUMERUS  1990    hardware removal       FAMILY HISTORY:  Family History   Problem Relation Age of Onset    Heart Disease Mother 70    Heart Disease Father 69    Thyroid Sister     Coronary artery disease Sister     Coronary artery disease Brother     Cancer Maternal Grandmother         lung    Heart Disease Maternal Grandfather         smoker    Lung Disease Son         asthma    Arthritis Son         scoliosis       SOCIAL HISTORY:   Pt lives in [place] with his wife and 2 dogs. He works in municipal water management, reports this is not physical labor to him, and no occupational exposures to be concerned about.  Smoking- never smoker  Etoh use- none  Drug use- none    DIET:   Orders Placed This Encounter   Procedures    Diet Order Diet: Cardiac; Miscellaneous modifications: (optional): No Decaf, No Caffeine(for test)     Standing Status:   Standing     Number of Occurrences:   1     Order Specific Question:   Diet:     Answer:   Cardiac [6]     Order Specific Question:    Miscellaneous modifications: (optional)     Answer:   No Decaf, No Caffeine(for test) [11]    Diet NPO Restrict to: Sips with Medications     Standing Status:   Standing     Number of Occurrences:   8     Order Specific Question:   Diet NPO Restrict to:     Answer:   Sips with Medications [3]       ALLERGIES:  Allergies   Allergen Reactions    Bactrim [Sulfamethoxazole W-Trimethoprim] Rash and Swelling     Swelling, rash and blistering in the groin    Other Drug Swelling     Numbing eye drop caused eye to swell shut, patient unable to recall name       OUTPATIENT MEDICATIONS:    Current Facility-Administered Medications:     morphine 4 MG/ML injection 2 mg, 2 mg, Intravenous, Once, Roverto Zhong M.D.    Respiratory Therapy Consult, , Nebulization, Continuous RT, Mikala Cool M.D.    senna-docusate (PERICOLACE or SENOKOT S) 8.6-50 MG per tablet 2 Tablet, 2 Tablet, Oral, BID **AND** polyethylene glycol/lytes (MIRALAX) PACKET 1 Packet, 1 Packet, Oral, QDAY PRN **AND** magnesium hydroxide (MILK OF MAGNESIA) suspension 30 mL, 30 mL, Oral, QDAY PRN **AND** bisacodyl (DULCOLAX) suppository 10 mg, 10 mg, Rectal, QDAY PRN, Cody Barone M.D.    enoxaparin (Lovenox) inj 40 mg, 40 mg, Subcutaneous, DAILY AT 1800, Cody Barone M.D.    albuterol inhaler 2 Puff, 2 Puff, Inhalation, Q4HRS PRN, Cody Barone M.D.    carBAMazepine SR (TEGRETOL XR) tablet 300 mg, 300 mg, Oral, BID, Cody Barone M.D., 300 mg at 05/27/23 1839    [START ON 5/28/2023] escitalopram (Lexapro) tablet 20 mg, 20 mg, Oral, DAILY, Cody Barone M.D.    [START ON 5/28/2023] loratadine (CLARITIN) tablet 10 mg, 10 mg, Oral, DAILY, Cody Barone M.D.    metoprolol tartrate (LOPRESSOR) tablet 12.5 mg, 12.5 mg, Oral, BID, Cody Barone M.D., 12.5 mg at 05/27/23 1834    nitroglycerin (NITROSTAT) tablet 0.4 mg, 0.4 mg, Sublingual, PRN, Cody Barone M.D.    atorvastatin (LIPITOR) tablet 80 mg, 80 mg, Oral, Q EVENING, Cody Barone M.D., 80 mg at 05/27/23  "1834    [START ON 2023] aspirin EC tablet 81 mg, 81 mg, Oral, DAILY, Cody Barone M.D.    PHYSICAL EXAM:  Vitals:    23 1300 23 1400 23 1500 23 1605   BP: (!) 145/76  119/67 136/71   Pulse: (!) 58 (!) 59 (!) 59 (!) 56   Resp: (!) 22 (!) 28  (!) 22   Temp:    36.1 °C (97 °F)   TempSrc:    Temporal   SpO2: 94% 95% 94% 94%   Weight:    113 kg (248 lb 14.4 oz)   Height:    1.753 m (5' 9\")   , Temp (24hrs), Av.3 °C (97.3 °F), Min:36.1 °C (97 °F), Max:36.4 °C (97.5 °F)  , Pulse Oximetry: 94 %, O2 (LPM): 0, O2 Delivery Device: None - Room Air    General: Pt resting in NAD, cooperative   Skin:  Pink, warm and dry.  No rashes  HEENT: NC/AT. PERRL. EOMI. MMM. No nasal discharge.  Lungs:  Symmetrical. CTAB. Good air movement   Cardiovascular:  RRR without murmurs, rubs, or gallops  Extremities: 1+ lower extremity edema bilaterally  CNS:  Alert and oriented      ERCourse:  Pt presented with chest pain and evaluated with EKG which showed questionable ST segment changes unchanged or improved from previous EKG in 2021. He had a negative chest xray, and laboratory work up with unremarkable CBC, CMP, and troponin of 8 and 8 on 2hr recheck. Given heart score of 5, pt was considered for admission for overnight observation.     LAB TESTS:   Recent Labs     23  1157   WBC 7.3   RBC 5.82   HEMOGLOBIN 15.9   HEMATOCRIT 46.9   MCV 80.6*   MCH 27.3   RDW 35.7*   PLATELETCT 284   MPV 8.6*   NEUTSPOLYS 67.80   LYMPHOCYTES 19.10*   MONOCYTES 8.40   EOSINOPHILS 2.30   BASOPHILS 1.20         Recent Labs     23  1157 23  1818   SODIUM 143  --    POTASSIUM 4.0  --    CHLORIDE 108  --    CO2 24  --    BUN 12  --    CREATININE 0.93  --    CALCIUM 9.2  --    MAGNESIUM  --  2.1   ALBUMIN 3.9  --        IMAGES:  Chest xray shows no acute changes    CONSULTS:   None    ASSESSMENT/PLAN: 53 y.o. male admitted for chest pain for observation.    #chest pain  Chest pain has come and gone " similar to stable angina, but is not provoked by activity and resolving with rest. On this occasion chest pain resembles unstable angina in that the pain is persisting even at rest, but with no elevations in troponin levels or EKG changes consistent with an MI. Plan for observing patient overnight, and optimizing medical management of known coronary artery disease.  - Change home rosuvastatin 10 to atorvastatin 40mg daily for higher intensity  - continue metoprolol 12.5 for cardiac benefit, but avoid raising dose due to sinus bradycardia.   -Echocardiogram to look for any structural disease that may be associated with symptoms, previous echo was in November of 2021 WNL, pt has not had imaging done elsewhere.     Core Measures:  Fluids: none  Lines: PIV  Abx: none  Diet: regular  PPX: enoxaparin 40  DISPO: Observation overnight for cardiac monitoring.      Roxi West MS3  R Medical Student  R Family Medicine

## 2023-05-27 NOTE — ED NOTES
Patient ambulatory to [facility]. Patient changed self into gown. Patient placed on monitoring. VSS. Chart up for eval by MD.

## 2023-05-27 NOTE — PROGRESS NOTES
Senior Admit Note:    Mr. Domenic Glover a 53 year old with a past medical history of CAD s/p stents x 2, HTN, HLD, cardiac arrhythmia who presents to [facility] ED today c/o C/P since 830 this morning with radiation down L arm.  Pain feels similar to his history of chest pain.  He states that he was playing with his dogs and went inside to side on the cough and noticed the CP at rest. Pain waxed and waned.  Pain was 1/10 on symptom onset but increased to 3/10 en route from [place].  Patient had stents placed in March 2022.  States that he has had chest pain and ever since his stent placement he had episodes of chest pain lasting 5 minutes almost on a daily basis.  States that this chest pain is different because of the time duration.  States that he still feels a 1/10 CP in ED. Pt states that he takes all of his medications with the exception 81 ASA which stopped 3-4 months ago.  In ED, patient troponin was negative.  ECG showed some ST depression most pronounced in Leads II, III, aVL, V4, and V5 but no ST elevation, T waves noted or other ST depression noted.      #Acute Coronary Syndrome  #Coronary Artery Disease   #Gastroesophageal Reflux Disease  #Obstructive Sleep Apnea  Patient a h/o of CAD with two stents placed in 3/2022.  Pt states that he has had episodes of stable angina ever since lasting about 5 minute and spontaneously resolving.  Duration of chest pain has worsened significantly which is concerning of possible stent restenosis.  Other possibilities include worsening CAD or stable angina.  I will monitor patient overnight.  Will consider to consult cardiology if symptoms worsen.  Otherwise, will consider stress test on 5/28/2023.   - admit to telemetry for observation  - telemetry  - titrate SpO2>90%  - pain control with acetaminophen is sufficient.  Consider morphine if his chest pain increases in severity.  - 81 mg ASA daily  - 40-80 mg atorvastatin daily  - continue 25 mg metoprolol BID  - 0.4 mg NTG SL  PRN  - will consider stress test in the AM  - records of previous cardiac cath will be requested

## 2023-05-27 NOTE — ED NOTES
Med rec completed per patient at bedside and patient's pharmacy, [facility] in [place] ([phone]).  Allergies reviewed with patient.  No outpatient antibiotics within the last 30 days.    Patient states no longer using aspirin or clopidogrel and last doses over 30 days ago; these medications have been removed from the med rec.

## 2023-05-27 NOTE — H&P
[facility] Internal Medicine History & Physical Note    Date of Service  5/27/2023    R Team: R  Orange Team   Attending: Mikala Cool M.d.  Senior Resident: Dr. Theodore  Intern:  Dr. Barone  Contact Number: [phone]    Primary Care Physician  Moses Martinez M.D.    Consultants  cardiology    Specialist Names: TBD    Code Status  No Order    Chief Complaint  Chief Complaint   Patient presents with    Chest Pain     L sided CP and L arm pain onset 0830 at 1/10, 3/10 at the highest, 1/10 now. Hx 3 stents. Not taking daily 81 mg ASA as directed. Taking metoprolol, -thinners. Hx CAD       History of Presenting Illness (HPI):  Domenic Glover is a 53 y.o. male who presented 5/27/2023 with chest pain. Patient reports chest pain began this morning after walking his dogs and sitting down to rest. Pain did not subsite and patient continues to have 2/10 pain in ED.  He denies taking aspirin or nitroglycerin this morning. Reports the pain is similar to pain he had in the past prior to cardiac stents. Patient characterizes the pain as a dull sensation 2/10, not pressure or push, located on the left chest. He reports he experienced chest pain 1-2x/day that lasted 5-10 minutes prior to stent placement.  He has radiation of the pain to the left arm, tingling sensation.  He reports he feels worsening chest pain upon exertion, which he describes difficulty breathing and sob worse than chest pain.  Patient reports in 11/2021 he underwent cardiac stress test in which he had heavy breathing and chest pain; he was not admitted to the hospital at that time, and was allowed to go home that day. He was seen by Dr. Dawkins at [facility] and had stents placed. He reports small amount of pain and tingling of the left arm consistently the same after stents were placed.     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  Patient reports he stopped taking plavix in march of this year (1 year post stents) and stopped taking aspirin several months ago (did not quantify). Patient endorses headache this morning on drive to hospital, dizziness, sob, and feeling hungry. Patient reports sob is worsened by carrying heavy objects or from going up 1 flight of stairs. Patient endorses chronic left lower extremity edema which has been present much longer than stent placement 1 year ago. Endorses normal bowel movement this morning, denies any blood in urine or stool.   Patient has extensive family history of heart disease; mother has 4vessel cabg at age 70; father 1 stent for MI at age 69; siblings (brother and sister) both have CAD, and paternal aunt. Patient works at [employer]. Lives with his wife and two dogs, owns his home.  Denies recreational drugs, alcohol, or smoking. No workplace exposures known.     ED Course:   Patient received 2mg morphine, EKG, chest xray and troponin (8).     I discussed the plan of care with patient.    Review of Systems  Review of Systems   Respiratory:  Positive for shortness of breath.    Cardiovascular:  Positive for chest pain.   Neurological:  Positive for dizziness, tingling and headaches.        Left arm tingling    All other systems reviewed and are negative.      Past Medical History   has a past medical history of Allergic rhinitis, Anesthesia, Arthritis, CAD (coronary artery disease) (03/10/2022), Dental disorder, GERD (gastroesophageal reflux disease), History of nephrolithiasis, Hypertriglyceridemia, LBP (low back pain), MDD (major depressive disorder), KRISTYN (obstructive sleep apnea), Premature ventricular contractions (PVCs) (VPCs) (02/03/2012), Psychiatric problem, Renal disorder, and Snoring.    Surgical History   has a past surgical history that includes inguinal hernia repair (2007); other (1995); other (1996); biceps tendon repair (2005); biceps tendon repair (2005); orif,  fracture, humerus (1990); orif, fracture, humerus (1990); knee arthroscopy (11/12/2009); case cancelled (2/1/2012); shoulder arthroscopy w/ bicipital tenodesis repair (2/22/2012); and shoulder decompression (2/22/2012).     Family History  family history includes Arthritis in his son; Cancer in his maternal grandmother; Heart Disease in his father, maternal grandfather, and mother; Lung Disease in his son; Thyroid in his sister.   Family history reviewed with patient.     Social History  Tobacco: denies  Alcohol: denies  Recreational drugs (illegal or prescription): denies  Employment: [employer]   Living Situation: with wife, two dogs, owns home  Recent Travel: Denies  Primary Care Provider: Reviewed Dr. Lopes  Other (stressors, spirituality, exposures): No known exposures    Allergies  Allergies   Allergen Reactions    Bactrim [Sulfamethoxazole W-Trimethoprim] Rash and Swelling     Swelling, rash and blistering in the groin    Other Drug Swelling     Numbing eye drop caused eye to swell shut, patient unable to recall name       Medications  Prior to Admission Medications   Prescriptions Last Dose Informant Patient Reported? Taking?   albuterol 108 (90 Base) MCG/ACT Aero Soln inhalation aerosol PRN at PRN Patient, Patient's Home Pharmacy Yes Yes   Sig: Inhale 2 Puffs every four hours as needed for Shortness of Breath.   carbamazepine (CARBATROL) 300 MG CR capsule 5/27/2023 at 0700 Patient, Patient's Home Pharmacy Yes No   Sig: Take 1 Capsule by mouth 2 times a day.   escitalopram (LEXAPRO) 20 MG tablet 5/27/2023 at 0700 Patient, Patient's Home Pharmacy Yes No   Sig: Take 20 mg by mouth every day.   fluticasone (FLONASE) 50 MCG/ACT nasal spray 5/27/2023 at 0700 Patient Yes Yes   Sig: Administer 1 Spray into each nostril every day.   loratadine (CLARITIN) 10 MG Tab 5/27/2023 at 0700 Patient Yes Yes   Sig: Take 10 mg by mouth every day.   metoprolol tartrate (LOPRESSOR) 25 MG Tab 5/27/2023 at 0700  Patient, Patient's Home Pharmacy No No   Sig: Take 0.5 Tablets by mouth 2 times a day.   nitroglycerin (NITROSTAT) 0.4 MG SL Tab PRN at PRN Patient No No   Sig: Place 1 Tablet under the tongue as needed for Chest Pain.   rosuvastatin (CRESTOR) 10 MG Tab 5/26/2023 at 2100 Patient, Patient's Home Pharmacy No No   Sig: Take 1 Tablet by mouth every evening.      Facility-Administered Medications: None       Physical Exam  Temp:  [36.4 °C (97.5 °F)] 36.4 °C (97.5 °F)  Pulse:  [62] 62  Resp:  [16] 16  BP: (136)/(84) 136/84  SpO2:  [96 %] 96 %  Blood Pressure: 136/84   Temperature: 36.4 °C (97.5 °F)   Pulse: 62   Respiration: 16   Pulse Oximetry: 96 %       Physical Exam  Vitals reviewed.   Constitutional:       Appearance: Normal appearance. He is obese.   HENT:      Head: Normocephalic and atraumatic.      Mouth/Throat:      Mouth: Mucous membranes are moist.   Eyes:      Extraocular Movements: Extraocular movements intact.      Conjunctiva/sclera: Conjunctivae normal.   Cardiovascular:      Rate and Rhythm: Normal rate and regular rhythm.      Pulses: Normal pulses.      Heart sounds: Normal heart sounds.   Pulmonary:      Effort: Pulmonary effort is normal.      Breath sounds: Normal breath sounds.   Abdominal:      General: Abdomen is protuberant.      Palpations: Abdomen is soft.   Musculoskeletal:         General: Normal range of motion.      Cervical back: Normal range of motion.      Right lower leg: Edema present.      Left lower leg: Edema present.   Skin:     General: Skin is warm and dry.   Neurological:      Mental Status: He is alert and oriented to person, place, and time. Mental status is at baseline.   Psychiatric:         Mood and Affect: Mood normal.         Behavior: Behavior normal.         Laboratory:  Recent Labs     05/27/23  1157   WBC 7.3   RBC 5.82   HEMOGLOBIN 15.9   HEMATOCRIT 46.9   MCV 80.6*   MCH 27.3   MCHC 33.9   RDW 35.7*   PLATELETCT 284   MPV 8.6*     Recent Labs     05/27/23  1157    SODIUM 143   POTASSIUM 4.0   CHLORIDE 108   CO2 24   GLUCOSE 124*   BUN 12   CREATININE 0.93   CALCIUM 9.2     Recent Labs     05/27/23  1157   ALTSGPT 19   ASTSGOT 15   ALKPHOSPHAT 124*   TBILIRUBIN 0.3   GLUCOSE 124*         No results for input(s): NTPROBNP in the last 72 hours.      Recent Labs     05/27/23  1157 05/27/23  1410   TROPONINT 8 8       Imaging:  DX-CHEST-PORTABLE (1 VIEW)   Final Result         No acute cardiac or pulmonary abnormality is identified.          X-Ray:  I have personally reviewed the images and compared with prior images.  EKG:  I have personally reviewed the images and compared with prior images.    Assessment/Plan:  Problem Representation: Domenic Glover is a 53 y.o. male who presented 5/27/2023 with chest pain. Patient has a past medical history notable for CAD with 2 vessel stent 3-2022,  sleep apnea, obesity, hypercholesterolemia, anxiety and seizure disorder of the right eye.   I anticipate this patient is appropriate for observation status at this time because no new changes on evaluation in ED.    Patient will need a Telemetry bed on MEDICAL service .  The need is secondary to continuous cardiac monitoring.    * Chest pain- (present on admission)  Assessment & Plan  The ASCVD Risk score (Marsland DK, et al., 2019) failed to calculate for the following reasons:    The valid total cholesterol range is 130 to 320 mg/dL  Past medical history of CAD, s/p 2 vessel stent placement in 3/2022.   Possibly due to unstable angina; patient not medically optimized  -medical optimization with:  Metoprolol tartrate 12.5 bid  High intensity statin, atorvastatin 80mg daily  Aspirin 324mg loading dose today, 81mg daily starting tomorrow  Nitroglycerin prn   Records request from [facility] cardiac cath report   Echocardiogram  Repeat trop q6h (troponin 8, 8 on repeat)  Repeat ekg   Telemetry for continuous cardiac monitoring    Seizure (HCC)  Assessment & Plan  History of epileptic seizure of the  right eye  Continue home carbamazepine 300mg bid     KRISTYN (obstructive sleep apnea)- (present on admission)  Assessment & Plan  Continue cpap with home settings of [id number]     MDD (major depressive disorder)- (present on admission)  Assessment & Plan  Continue home escitalopram 20mg daily         VTE prophylaxis: enoxaparin ppx

## 2023-05-27 NOTE — ED PROVIDER NOTES
ER Provider Note    Scribed for Roverto Zhong M.d. by Monico Isidro. 5/27/2023  12:16 PM    Primary Care Provider: Moses Martinez M.D.    CHIEF COMPLAINT  Chief Complaint   Patient presents with    Chest Pain     L sided CP and L arm pain onset 0830 at 1/10, 3/10 at the highest, 1/10 now. Hx 3 stents. Not taking daily 81 mg ASA as directed. Taking metoprolol, -thinners. Hx CAD     EXTERNAL RECORDS REVIEWED  Outpatient Notes shows that the patient had a stress echocardiogram done that got him his heart catheterization in November 2021.    HPI/ROS  LIMITATION TO HISTORY   Select: : None  OUTSIDE HISTORIAN(S):  None    Domenic Glover is a 53 y.o. male who presents to the ED complaining of acute chest pain onset 8:30 AM this morning. Patient notes his pain is constant, describing it as an ache. He reports that his pain is similar to his pain he had when he had his stents placed in 2022. He notes his initial pain was at a 1/10. On the drive here to the hospital, it was a 3/10. Currently in the ED, he rates his pain a 1/10. He has associated left arm tingling. Denies any nausea, vomiting, shortness of breath, leg swelling, back pain, or abdominal pain. No alleviating or exacerbating factors reported. Denies being on blood thinners or Aspirin at this time. Denies history of hypertension. Patient has a history of hyperlipidemia and coronary artery disease. He had stents placed in 2022. Denies smoking or drug use. Drug allergies to Bactrim.     PAST MEDICAL HISTORY  Past Medical History:   Diagnosis Date    Allergic rhinitis     Anesthesia     Prolonged nausea and vomiting    Arthritis     CAD (coronary artery disease) 03/10/2022    PCI/ANA x 2: OM ([facility] [place] 2.25 x 18mm) and LCx/OM ([facility] [place] 2.5 x 15mm) by Dr. Peraza.    Dental disorder     crown lower left    GERD (gastroesophageal reflux disease)     History of nephrolithiasis     Hypertriglyceridemia     LBP (low back pain)     MDD (major depressive  disorder)     KRISTYN (obstructive sleep apnea)     Premature ventricular contractions (PVCs) (VPCs) 02/03/2012    Stress echocardiogram negative for ischemia, EF at rest 63%. Holter monitor with multiple interpolated PVCs, with occasional bigemy with pauses, resolve with exercise.    Psychiatric problem     Depression    Renal disorder     History of kidney stones    Snoring        SURGICAL HISTORY  Past Surgical History:   Procedure Laterality Date    SHOULDER ARTHROSCOPY W/ BICIPITAL TENODESIS REPAIR  2/22/2012    Performed by STEVENSON BRUCE at SURGERY [facility]    SHOULDER DECOMPRESSION  2/22/2012    Performed by STEVENSON BRUCE at SURGERY [facility]    CASE CANCELLED  2/1/2012    Performed by STEVENSON BRUCE at SURGERY [facility]    KNEE ARTHROSCOPY  11/12/2009    Performed by ANGELA INFANTE at SURGERY [facility] ORS    INGUINAL HERNIA REPAIR  2007    left    BICEPS TENDON REPAIR  2005    left    BICEPS TENDON REPAIR  2005    hardware removal    OTHER  1996    tonsillectomy    OTHER  1995    sinus surgery    ORIF, FRACTURE, HUMERUS  1990    left    ORIF, FRACTURE, HUMERUS  1990    hardware removal       FAMILY HISTORY  Family History   Problem Relation Age of Onset    Heart Disease Mother     Heart Disease Father     Thyroid Sister     Cancer Maternal Grandmother         lung    Heart Disease Maternal Grandfather         smoker    Lung Disease Son         asthma    Arthritis Son         scoliosis       SOCIAL HISTORY   reports that he has never smoked. He has never used smokeless tobacco. He reports that he does not drink alcohol and does not use drugs.    CURRENT MEDICATIONS  Previous Medications    ASPIRIN EC (ECOTRIN) 81 MG TABLET DELAYED RESPONSE    Take 1 Tablet by mouth every day.    CARBAMAZEPINE (CARBATROL) 300 MG CR CAPSULE    Take 1 Capsule by mouth 2 times a day.    CLOPIDOGREL (PLAVIX) 75 MG TAB    Take 1 Tablet by mouth every day.    ESCITALOPRAM (LEXAPRO) 20 MG TABLET    TAKE 1  "TABLET BY MOUTH DAILY    IBUPROFEN (MOTRIN) 200 MG TAB    Take 4 Tablets by mouth 2 times a day as needed for Mild Pain.    METOPROLOL TARTRATE (LOPRESSOR) 25 MG TAB    Take 0.5 Tablets by mouth 2 times a day.    NITROGLYCERIN (NITROSTAT) 0.4 MG SL TAB    Place 1 Tablet under the tongue as needed for Chest Pain.    PREDNISONE (DELTASONE) 20 MG TAB    1 TAB BY MOUTH ONCE ONLY IF NEEDED FOR COUGH. TAKE WITH FOOD.    ROSUVASTATIN (CRESTOR) 10 MG TAB    Take 1 Tablet by mouth every evening.       ALLERGIES  Bactrim [sulfamethoxazole w-trimethoprim] and Other drug    PHYSICAL EXAM  /84   Pulse 62   Temp 36.4 °C (97.5 °F) (Temporal)   Resp 16   Ht 1.753 m (5' 9\")   Wt 114 kg (250 lb 14.1 oz)   SpO2 96%   BMI 37.05 kg/m²   Constitutional: Well developed, Well nourished, No acute distress, Non-toxic appearance.   HENT: Normocephalic, Atraumatic, mucous membranes moist, no erythema, exudates, swelling, or masses, nares patent  Eyes: nonicteric  Neck: Supple, no meningismus  Lymphatic: No lymphadenopathy noted.   Cardiovascular: Regular rate and rhythm, no gallops rubs or murmurs  Lungs: Clear bilaterally   Abdomen: Soft and nontender throughout  Skin: Warm, Dry, no rash  Genitalia: Deferred  Rectal: Deferred  Extremities: No edema  Neurologic: Alert, appropriate, follows commands, moving all extremities, normal speech   Psychiatric: Affect normal    DIAGNOSTIC STUDIES    Labs:   Results for orders placed or performed during the hospital encounter of 05/27/23   CBC with Differential   Result Value Ref Range    WBC 7.3 4.8 - 10.8 K/uL    RBC 5.82 4.70 - 6.10 M/uL    Hemoglobin 15.9 14.0 - 18.0 g/dL    Hematocrit 46.9 42.0 - 52.0 %    MCV 80.6 (L) 81.4 - 97.8 fL    MCH 27.3 27.0 - 33.0 pg    MCHC 33.9 32.3 - 36.5 g/dL    RDW 35.7 (L) 35.9 - 50.0 fL    Platelet Count 284 164 - 446 K/uL    MPV 8.6 (L) 9.0 - 12.9 fL    Neutrophils-Polys 67.80 44.00 - 72.00 %    Lymphocytes 19.10 (L) 22.00 - 41.00 %    Monocytes 8.40 " 0.00 - 13.40 %    Eosinophils 2.30 0.00 - 6.90 %    Basophils 1.20 0.00 - 1.80 %    Immature Granulocytes 1.20 (H) 0.00 - 0.90 %    Nucleated RBC 0.00 0.00 - 0.20 /100 WBC    Neutrophils (Absolute) 4.97 1.82 - 7.42 K/uL    Lymphs (Absolute) 1.40 1.00 - 4.80 K/uL    Monos (Absolute) 0.62 0.00 - 0.85 K/uL    Eos (Absolute) 0.17 0.00 - 0.51 K/uL    Baso (Absolute) 0.09 0.00 - 0.12 K/uL    Immature Granulocytes (abs) 0.09 0.00 - 0.11 K/uL    NRBC (Absolute) 0.00 K/uL   Complete Metabolic Panel (CMP)   Result Value Ref Range    Sodium 143 135 - 145 mmol/L    Potassium 4.0 3.6 - 5.5 mmol/L    Chloride 108 96 - 112 mmol/L    Co2 24 20 - 33 mmol/L    Anion Gap 11.0 7.0 - 16.0    Glucose 124 (H) 65 - 99 mg/dL    Bun 12 8 - 22 mg/dL    Creatinine 0.93 0.50 - 1.40 mg/dL    Calcium 9.2 8.5 - 10.5 mg/dL    AST(SGOT) 15 12 - 45 U/L    ALT(SGPT) 19 2 - 50 U/L    Alkaline Phosphatase 124 (H) 30 - 99 U/L    Total Bilirubin 0.3 0.1 - 1.5 mg/dL    Albumin 3.9 3.2 - 4.9 g/dL    Total Protein 6.0 6.0 - 8.2 g/dL    Globulin 2.1 1.9 - 3.5 g/dL    A-G Ratio 1.9 g/dL   Troponins NOW   Result Value Ref Range    Troponin T 8 6 - 19 ng/L   CORRECTED CALCIUM   Result Value Ref Range    Correct Calcium 9.3 8.5 - 10.5 mg/dL   ESTIMATED GFR   Result Value Ref Range    GFR (CKD-EPI) 98 >60 mL/min/1.73 m 2   EKG   Result Value Ref Range    Report       [facility] Emergency Dept.    Test Date:  2023  Pt Name:    SAMI HERRERA                Department: ER  MRN:        [id number]                      Room:  Gender:     Male                         Technician: [id number]  :        1970                   Requested By:ER TRIAGE PROTOCOL  Order #:    [id number]                    Reading MD:    Measurements  Intervals                                Axis  Rate:       55                           P:          26  TN:         164                          QRS:        4  QRSD:       88                           T:          49  QT:          413  QTc:        395    Interpretive Statements  Sinus bradycardia  Low voltage, precordial leads  Compared to ECG 11/16/2021 12:57:04  Low QRS voltage now present  Sinus rhythm no longer present          EKG:   I have independently interpreted this EKG  EKG:   Obtained at 11:48 AM  Normal Sinus rhythm   Rate 55  Axis normal   Intervals normal   There is trace ST depression inferolaterally, more prominent in his previous EKG done on 11/16/21  No T wave inversions    Radiology:   The attending emergency physician has independently interpreted the diagnostic imaging associated with this visit and am waiting the final reading from the radiologist.     Radiologist interpretation:   DX-CHEST-PORTABLE (1 VIEW)   Final Result         No acute cardiac or pulmonary abnormality is identified.          COURSE & MEDICAL DECISION MAKING     ED Observation Status? Yes; I am placing the patient in to an observation status due to a diagnostic uncertainty as well as therapeutic intensity. Patient placed in observation status at 12:21 PM, 5/27/2023.     Observation plan is as follows: We will manage their symptoms, evaluate with lab work and imaging, and then reassess after results are reviewed      Upon Reevaluation, the patient's condition has: not improved; and will be escalated to hospitalization.    Patient discharged from ED Observation status at 1:23 PM  (Time) 5/27/2023 (Date).     INITIAL ASSESSMENT, COURSE AND PLAN  Care Narrative: This is a 53-year-old who presents with chest pain about a 1 out of 10 on arrival.  He had a history of prior cardiac stents x3.  He is followed by Dr. Butts.  His symptoms feel similar to his last episode of chest pain that required multiple stent placement.  His EKG here demonstrates some trace inferolateral ST depression which was more prominent on prior EKG.  I see no evidence of STEMI.  I do not strongly suspect PE-he has some chronic swelling in his left leg that is unchanged but no  tachypnea tachycardia or hypoxia.  No recent travel or surgery.  I do not strongly suspect dissection.  Initial troponin here is negative and chest x-ray was reassuring.  Patient will be admitted for work-up of ACS.    12:16 PM - Patient seen and examined at bedside. Discussed plan of care, including obtaining lab work and imaging for further evaluation. Patient agrees to the plan of care. Ordered for DX-chest, EKG, CBC with diff, CMP, and Troponin NOW to evaluate his symptoms.      1:23 PM - Patient was reevaluated at bedside. Discussed lab and radiology results with the patient and informed them of the plan for admission. Patient understands and verbalizes agreement to plan of care.     1:20 PM - Paged [facility]    1:38 PM - Hospitalist responded. I discussed the patient's case and the above findings with [facility] who agrees to evaluate the patient for hospitalization.        ADDITIONAL PROBLEM LIST      DISPOSITION AND DISCUSSIONS  I have discussed management of the patient with the following physicians and KASANDRA's:  [facility]     Discussion of management with other QHP or appropriate source(s): None     Decision tools and prescription drugs considered including, but not limited to: HEART Score 5 .    Patient will be hospitalized by [facility]     FINAL DIAGNOSIS  1. Chest pain, unspecified type         The note accurately reflects work and decisions made by me.  Roverto Zhong M.D.  5/27/2023  1:41 PM

## 2023-05-27 NOTE — ED TRIAGE NOTES
Domenic Glover  53 y.o. male  Chief Complaint   Patient presents with    Chest Pain     L sided CP and L arm pain onset 0830 at 1/10, 3/10 at the highest, 1/10 now. Hx 3 stents. Not taking daily 81 mg ASA as directed. Taking metoprolol, -thinners. Hx CAD       Pt ambulatory to triage with steady gait for above complaint.     Pt is GCS 15, speaking in full sentences, follows commands and responds appropriately to questions. Resp are even and unlabored.     CP protocol ordered. Pt placed in draw room hallway. Pt educated on triage process. Pt encouraged to alert staff for any changes.     This RN masked and in appropriate PPE during encounter.     Vitals:    05/27/23 1133   BP: 136/84   Pulse: 62   Resp: 16   Temp: 36.4 °C (97.5 °F)   SpO2: 96%

## 2023-05-28 VITALS
OXYGEN SATURATION: 91 % | RESPIRATION RATE: 18 BRPM | HEIGHT: 69 IN | SYSTOLIC BLOOD PRESSURE: 121 MMHG | WEIGHT: 248.9 LBS | HEART RATE: 64 BPM | BODY MASS INDEX: 36.87 KG/M2 | TEMPERATURE: 97 F | DIASTOLIC BLOOD PRESSURE: 75 MMHG

## 2023-05-28 PROBLEM — R07.9 CHEST PAIN: Status: RESOLVED | Noted: 2023-05-27 | Resolved: 2023-05-28

## 2023-05-28 LAB
ALBUMIN SERPL BCP-MCNC: 3.7 G/DL (ref 3.2–4.9)
ALBUMIN/GLOB SERPL: 2.1 G/DL
ALP SERPL-CCNC: 110 U/L (ref 30–99)
ALT SERPL-CCNC: 19 U/L (ref 2–50)
ANION GAP SERPL CALC-SCNC: 15 MMOL/L (ref 7–16)
AST SERPL-CCNC: 17 U/L (ref 12–45)
BASOPHILS # BLD AUTO: 1.6 % (ref 0–1.8)
BASOPHILS # BLD: 0.1 K/UL (ref 0–0.12)
BILIRUB SERPL-MCNC: 0.3 MG/DL (ref 0.1–1.5)
BUN SERPL-MCNC: 14 MG/DL (ref 8–22)
CALCIUM ALBUM COR SERPL-MCNC: 8.7 MG/DL (ref 8.5–10.5)
CALCIUM SERPL-MCNC: 8.5 MG/DL (ref 8.5–10.5)
CHLORIDE SERPL-SCNC: 105 MMOL/L (ref 96–112)
CHOLEST SERPL-MCNC: 141 MG/DL (ref 100–199)
CO2 SERPL-SCNC: 19 MMOL/L (ref 20–33)
CREAT SERPL-MCNC: 0.95 MG/DL (ref 0.5–1.4)
EKG IMPRESSION: NORMAL
EOSINOPHIL # BLD AUTO: 0.28 K/UL (ref 0–0.51)
EOSINOPHIL NFR BLD: 4.4 % (ref 0–6.9)
ERYTHROCYTE [DISTWIDTH] IN BLOOD BY AUTOMATED COUNT: 36.2 FL (ref 35.9–50)
GFR SERPLBLD CREATININE-BSD FMLA CKD-EPI: 96 ML/MIN/1.73 M 2
GLOBULIN SER CALC-MCNC: 1.8 G/DL (ref 1.9–3.5)
GLUCOSE SERPL-MCNC: 123 MG/DL (ref 65–99)
HCT VFR BLD AUTO: 46.3 % (ref 42–52)
HDLC SERPL-MCNC: 29 MG/DL
HGB BLD-MCNC: 15.4 G/DL (ref 14–18)
IMM GRANULOCYTES # BLD AUTO: 0.08 K/UL (ref 0–0.11)
IMM GRANULOCYTES NFR BLD AUTO: 1.3 % (ref 0–0.9)
LDLC SERPL CALC-MCNC: 61 MG/DL
LV EJECT FRACT  99904: 55
LV EJECT FRACT MOD 2C 99903: 67.29
LV EJECT FRACT MOD 4C 99902: 75.03
LV EJECT FRACT MOD BP 99901: 72.08
LYMPHOCYTES # BLD AUTO: 1.3 K/UL (ref 1–4.8)
LYMPHOCYTES NFR BLD: 20.4 % (ref 22–41)
MCH RBC QN AUTO: 27.2 PG (ref 27–33)
MCHC RBC AUTO-ENTMCNC: 33.3 G/DL (ref 32.3–36.5)
MCV RBC AUTO: 81.8 FL (ref 81.4–97.8)
MONOCYTES # BLD AUTO: 0.52 K/UL (ref 0–0.85)
MONOCYTES NFR BLD AUTO: 8.2 % (ref 0–13.4)
NEUTROPHILS # BLD AUTO: 4.1 K/UL (ref 1.82–7.42)
NEUTROPHILS NFR BLD: 64.1 % (ref 44–72)
NRBC # BLD AUTO: 0 K/UL
NRBC BLD-RTO: 0 /100 WBC (ref 0–0.2)
PLATELET # BLD AUTO: 228 K/UL (ref 164–446)
PMV BLD AUTO: 8.4 FL (ref 9–12.9)
POTASSIUM SERPL-SCNC: 4.1 MMOL/L (ref 3.6–5.5)
PROT SERPL-MCNC: 5.5 G/DL (ref 6–8.2)
RBC # BLD AUTO: 5.66 M/UL (ref 4.7–6.1)
SODIUM SERPL-SCNC: 139 MMOL/L (ref 135–145)
TRIGL SERPL-MCNC: 257 MG/DL (ref 0–149)
TROPONIN T SERPL-MCNC: 7 NG/L (ref 6–19)
WBC # BLD AUTO: 6.4 K/UL (ref 4.8–10.8)

## 2023-05-28 PROCEDURE — 85025 COMPLETE CBC W/AUTO DIFF WBC: CPT

## 2023-05-28 PROCEDURE — 80053 COMPREHEN METABOLIC PANEL: CPT

## 2023-05-28 PROCEDURE — A9270 NON-COVERED ITEM OR SERVICE: HCPCS

## 2023-05-28 PROCEDURE — 93306 TTE W/DOPPLER COMPLETE: CPT | Mod: 26 | Performed by: INTERNAL MEDICINE

## 2023-05-28 PROCEDURE — 80061 LIPID PANEL: CPT

## 2023-05-28 PROCEDURE — 99239 HOSP IP/OBS DSCHRG MGMT >30: CPT | Performed by: INTERNAL MEDICINE

## 2023-05-28 PROCEDURE — G0378 HOSPITAL OBSERVATION PER HR: HCPCS

## 2023-05-28 PROCEDURE — 84484 ASSAY OF TROPONIN QUANT: CPT

## 2023-05-28 PROCEDURE — 93010 ELECTROCARDIOGRAM REPORT: CPT | Performed by: INTERNAL MEDICINE

## 2023-05-28 PROCEDURE — 700102 HCHG RX REV CODE 250 W/ 637 OVERRIDE(OP)

## 2023-05-28 RX ADMIN — ASPIRIN 81 MG: 81 TABLET, COATED ORAL at 05:48

## 2023-05-28 RX ADMIN — ESCITALOPRAM OXALATE 20 MG: 10 TABLET ORAL at 05:48

## 2023-05-28 RX ADMIN — LORATADINE 10 MG: 10 TABLET ORAL at 05:49

## 2023-05-28 RX ADMIN — CARBAMAZEPINE 300 MG: 100 TABLET, EXTENDED RELEASE ORAL at 05:48

## 2023-05-28 ASSESSMENT — PAIN DESCRIPTION - PAIN TYPE: TYPE: ACUTE PAIN

## 2023-05-28 ASSESSMENT — PATIENT HEALTH QUESTIONNAIRE - PHQ9
1. LITTLE INTEREST OR PLEASURE IN DOING THINGS: NOT AT ALL
2. FEELING DOWN, DEPRESSED, IRRITABLE, OR HOPELESS: NOT AT ALL
SUM OF ALL RESPONSES TO PHQ9 QUESTIONS 1 AND 2: 0

## 2023-05-28 NOTE — HOSPITAL COURSE
Mr. Domenic Glover is a 53 y.o. male with a PMHx of MI status post 3 stents with the last being in 3/202 2, sleep apnea, hypercholesterolemia, seizures, right optic nerve disorder, anxiety, who presented 5/27/2023 with chest pain.     Patient reports chest pain began this morning after walking his dogs and sitting down to rest. Pain did not subsite and patient continues to have 2/10 pain in ED.  He denies taking aspirin or nitroglycerin this morning. Reports the pain is similar to pain he had in the past prior to cardiac stents. Patient characterizes the pain as a dull sensation 2/10, not pressure or push, located on the left chest. He reports he experienced chest pain 1-2x/day that lasted 5-10 minutes prior to stent placement.  He has radiation of the pain to the left arm, tingling sensation.  He reports he feels worsening chest pain upon exertion, which he describes difficulty breathing and sob worse than chest pain.  Patient reports in 11/2021 he underwent cardiac stress test in which he had heavy breathing and chest pain; he was not admitted to the hospital at that time, and was allowed to go home that day. He was seen by Dr. Dawkins at [facility] and had stents placed. He reports small amount of pain and tingling of the left arm consistently the same after stents were placed.       Patient reports he stopped taking plavix in March of this year (1 year post stents) and stopped taking aspirin several months ago (did not quantify). Patient endorses headache this morning on drive to hospital, dizziness, sob, and feeling hungry. Patient reports sob is worsened by carrying heavy objects or from going up 1 flight of stairs. Patient endorses chronic left lower extremity edema which has been present much longer than stent placement 1 year ago. Endorses normal bowel movement this morning, denies any blood in urine or stool.  Lives with his wife and two dogs, owns his home.  Denies recreational drugs, alcohol, or smoking. No  workplace exposures known.     In ER, patient noted to have normal vital signs with no elevation in blood pressure.  Notable lab findings include glucose at 124, alk phos 124, initial troponin T 8.  Initial EKG notes sinus bradycardia with rate at 57, QTc 420.  Patient admitted to hospital medicine for management of care.    Patient seen and examined prior to being discharged.  An echocardiogram was pursued noting left ventricular size, thickness, and systolic function normal with LVEF approximately 55%; RV dilated with systolic function normal; no significant valvular disease.  Troponin trended which was plateaued at 8, 8, 9.  Patient denies any chest pain on examination.  Patient will follow up with new cardiologist, Dr. Harman Butts in a couple of weeks.  Discussed with patient continuation of all home medications.  Patient to follow-up with PCP s/p hospitalization.  All questions and concerns answered prior to being discharged.  Patient discharged home.

## 2023-05-28 NOTE — ASSESSMENT & PLAN NOTE
The ASCVD Risk score (Cruzito THOMPSON, et al., 2019) failed to calculate for the following reasons:    The valid total cholesterol range is 130 to 320 mg/dL  Past medical history of CAD, s/p 2 vessel stent placement in 3/2022.   Possibly due to unstable angina; patient not medically optimized  -medical optimization with:  Metoprolol tartrate 12.5 bid  High intensity statin, atorvastatin 80mg daily  Aspirin 324mg loading dose today, 81mg daily starting tomorrow  Nitroglycerin prn   Records request from [facility] cardiac cath report   Echocardiogram  Repeat trop q6h (troponin 8, 8 on repeat)  Repeat ekg   Telemetry for continuous cardiac monitoring

## 2023-05-28 NOTE — DISCHARGE PLANNING
[address] at [place]  [phone]      11/20/19 1:53 PM Spoke with patient. She is currently scheduled for office visit 12/17. Advised that she have repeat labs now as well as a couple days before December appointment. Patient states she has one lab sheet for [facility] (CBC, iron profile, and ferritin). Advised that she use this sheet for labs now. Second order will either be faxed to [facility] or patient will  from office. No further questions or concerns at this time. [facility] TCN chart review completed. Collaborated with DEREK Waggoner prior to meeting with the pt. The most current review of medical record, knowledge of pt's PLOF and social support, LACE+ score of 25 was considered.  No 6 clicks scores.     Pt seen at bedside. Introduced TCN program. Provided education regarding post acute levels of care. Discussed [facility] plan benefits (Meds to Beds, medical uber and [facility] transitional care). Pt verbalizes understanding.     Patient states he lives with his spouse Sole and 2 dogs and was independent with ADL's, IADL's, mobility (no AD) and driving at baseline.  He denies any concerns with food, housing or transportation and states he is currently at his baseline level of function.  Patient is currently ambulating to the BR no assistance and no assistive device and is on RA.  He states he uses a CPAP at night, no O2 needs.     TCN will continue to follow and collaborate with discharge planning team as additional post acute needs arise. Thank you.     Completed today:  Choice obtained: None.  Patient reports he is at his baseline level of function.    [facility] [place] Not sent.  Low LACE.  Patient out of services area in [place].

## 2023-05-28 NOTE — DISCHARGE INSTRUCTIONS
Discharge Instructions    Discharged to home by car with relative. Discharged via walking, hospital escort: Yes.  Special equipment needed: Not Applicable    Be sure to schedule a follow-up appointment with your primary care doctor or any specialists as instructed.     Discharge Plan:   Diet Plan: Discussed  Activity Level: Discussed  Confirmed Follow up Appointment: Patient to Call and Schedule Appointment  Confirmed Symptoms Management: Discussed  Medication Reconciliation Updated: Yes    I understand that a diet low in cholesterol, fat, and sodium is recommended for good health. Unless I have been given specific instructions below for another diet, I accept this instruction as my diet prescription.   Other diet: Heart Healthy    Special Instructions: None    -Is this patient being discharged with medication to prevent blood clots?  No    Is patient discharged on Warfarin / Coumadin?   No     Discharge Instructions per Dr. Kamaljit Hernandez D.OTyler    DIET: Diet Order Diet: Cardiac; Miscellaneous modifications: (optional): No Decaf, No Caffeine(for test)    ACTIVITY: As tolerated    A proper diet that is low in grease, fat, and salt, along with 30 minutes of exercise per day will lead to weight loss, and better controlled blood sugar and blood pressure.    DIAGNOSIS: Chest pain    Follow up with your Primary Care Provider Moses Martinez M.D. as scheduled or sooner if your symptoms persist or worsen.  Return to Emergency Room for sever chest pain, shortness of breath, signs of a stroke, or any other emergencies.

## 2023-05-28 NOTE — PROGRESS NOTES
2 RN Skin Assessment Completed by Janine RN and JO ANN Middleton.     Head: WDL  Ears: WDL  Nose: WDL  Mouth: WDL  Neck: WDL  Breasts/Chest: WDL  Shoulder Blades: WDL  Spine: WDL  (R) Arm/Elbow/hand: WDL  (L) Arm/Elbow/hand: WDL  Abdomen:WDL  Groin: WDL  Sacrum/Coccyx/Buttocks: blanching  (R) Leg: WDL  (L) Leg: WDL  (R) Heel/Foot/Toe: blanching  (L) Heel/Foot/Toe: blanching              Devices in place: BP Cuff and Pulse Ox     Interventions in place: Pillows     Possible skin injury found: No     Pictures uploaded into Epic: N/A  Wound Consult Placed: N/A

## 2023-05-28 NOTE — PROGRESS NOTES
[facility] Internal Medicine Daily Progress Note    Date of Service  5/28/2023    [facility] Team: [facility] IM [facility] Team   Attending: Andrew Saez, *  Senior Resident: Dr. Theodore  Intern:  Dr. Barone  Contact Number: [phone]    Chief Complaint  Domenic Glover is a 53 y.o. male admitted 5/27/2023 with chest pain.    Interval Problem Update  This note is to document outside records of Cardiac Cath Report. Patient underwent procedure of LHC without LV gram, did not cross AV; selective R and L coronary angiogram; PTCA and PCI with 2 overlapping ANA resolute [place] 2.25 x 18 mm and 2.50 x 15mm (post dilated up to 2.65mm proximal and 2.35 mm distally) to the mid-LCX into (proximal-mid) OM3. Indicates of cardiac cath were for chest pain, abnormal stress test, hyperlipidemia, and elevated BP.     Recommendations after cardiac cath: stressful two overlapping drug-eluting stents from the mid left circumflex to the OM3. Aspirin and plavix. Started on statin.     Full report that was faxed over from outside hospital will be uploaded to the chart.

## 2023-05-28 NOTE — PROGRESS NOTES
Patient stated he is getting a stress test in the morning. No stress test is ordered in epic but NPO at midnight is ordered, unsure what that order was placed for. Discussed with [facility] resident Philippe, who informed this RN that currently no stress test is planned for AM. Provider changed patients diet order back to cardiac diet. Patient stated he will still not eat or drink after midnight due to not wanting to delay a stress test if they still want it in the morning. Will need to clarify with AM doctor/pass onto day RN. Will hold 0600 po metoprolol pending order clarification from day doctor. Philippe HENSLEY aware.

## 2023-05-28 NOTE — PROGRESS NOTES
Bedside report received 0703. POC discussed with pt; Pt verbalizes chest discomfort remains the same 1/10 describes as annoyance; Declines need for intervention; No overnight cardiac events; all questions answered at this time.

## 2023-05-28 NOTE — DISCHARGE SUMMARY
Discharge Summary    CHIEF COMPLAINT ON ADMISSION  Chief Complaint   Patient presents with    Chest Pain     L sided CP and L arm pain onset 0830 at 1/10, 3/10 at the highest, 1/10 now. Hx 3 stents. Not taking daily 81 mg ASA as directed. Taking metoprolol, -thinners. Hx CAD       Reason for Admission  Chest pain      Admission Date  5/27/2023    CODE STATUS  Full Code    HPI & HOSPITAL COURSE  Mr. Domenic Glover is a 53 y.o. male with a PMHx of MI status post 3 stents with the last being in 3/202 2, sleep apnea, hypercholesterolemia, seizures, right optic nerve disorder, anxiety, who presented 5/27/2023 with chest pain.     Patient reports chest pain began this morning after walking his dogs and sitting down to rest. Pain did not subsite and patient continues to have 2/10 pain in ED.  He denies taking aspirin or nitroglycerin this morning. Reports the pain is similar to pain he had in the past prior to cardiac stents. Patient characterizes the pain as a dull sensation 2/10, not pressure or push, located on the left chest. He reports he experienced chest pain 1-2x/day that lasted 5-10 minutes prior to stent placement.  He has radiation of the pain to the left arm, tingling sensation.  He reports he feels worsening chest pain upon exertion, which he describes difficulty breathing and sob worse than chest pain.  Patient reports in 11/2021 he underwent cardiac stress test in which he had heavy breathing and chest pain; he was not admitted to the hospital at that time, and was allowed to go home that day. He was seen by Dr. Dawkins at [facility] and had stents placed. He reports small amount of pain and tingling of the left arm consistently the same after stents were placed.       Patient reports he stopped taking plavix in March of this year (1 year post stents) and stopped taking aspirin several months ago (did not quantify). Patient endorses headache this morning on drive to hospital, dizziness, sob, and feeling hungry.  Patient reports sob is worsened by carrying heavy objects or from going up 1 flight of stairs. Patient endorses chronic left lower extremity edema which has been present much longer than stent placement 1 year ago. Endorses normal bowel movement this morning, denies any blood in urine or stool.  Lives with his wife and two dogs, owns his home.  Denies recreational drugs, alcohol, or smoking. No workplace exposures known.     In ER, patient noted to have normal vital signs with no elevation in blood pressure.  Notable lab findings include glucose at 124, alk phos 124, initial troponin T 8.  Initial EKG notes sinus bradycardia with rate at 57, QTc 420.  Patient admitted to hospital medicine for management of care.    Patient seen and examined prior to being discharged.  An echocardiogram was pursued noting left ventricular size, thickness, and systolic function normal with LVEF approximately 55%; RV dilated with systolic function normal; no significant valvular disease.  Troponin trended which was plateaued at 8, 8, 9.  Patient denies any chest pain on examination.  Patient will follow up with new cardiologist, Dr. Harman Butts in a couple of weeks.  Discussed with patient continuation of all home medications.  Patient to follow-up with PCP s/p hospitalization.  All questions and concerns answered prior to being discharged.  Patient discharged home.    Therefore, he is discharged in fair and stable condition to home with close outpatient follow-up.    The patient recovered much more quickly than anticipated on admission.    Discharge Date  5/28/2023    FOLLOW UP ITEMS POST DISCHARGE  None    DISCHARGE DIAGNOSES  Principal Problem (Resolved):    Chest pain (POA: Yes)  Active Problems:    MDD (major depressive disorder) (POA: Yes)    KRISTYN (obstructive sleep apnea) (POA: Yes)    Seizure (HCC) (POA: Unknown)      FOLLOW UP  Future Appointments   Date Time Provider Department Center   6/15/2023  2:30 PM IZA Steiner  [facility] None     Moses Martinez M.D.  [address] [facility] [address] [phone]    Schedule an appointment as soon as possible for a visit in 1 week(s)      Rylan Butts M.D.  [address]  [address]  [facility] [address]  [phone]    Schedule an appointment as soon as possible for a visit in 1 week(s)        MEDICATIONS ON DISCHARGE     Medication List        CONTINUE taking these medications        Instructions   albuterol 108 (90 Base) MCG/ACT Aers inhalation aerosol   Inhale 2 Puffs every four hours as needed for Shortness of Breath.  Dose: 2 Puff     carbamazepine 300 MG CR capsule  Commonly known as: CARBATROL   Take 1 Capsule by mouth 2 times a day.  Dose: 300 mg     escitalopram 20 MG tablet  Commonly known as: LEXAPRO   Take 20 mg by mouth every day.  Dose: 20 mg     fluticasone 50 MCG/ACT nasal spray  Commonly known as: FLONASE   Administer 1 Spray into each nostril every day.  Dose: 1 Spray     loratadine 10 MG Tabs  Commonly known as: CLARITIN   Take 10 mg by mouth every day.  Dose: 10 mg     metoprolol tartrate 25 MG Tabs  Commonly known as: LOPRESSOR   Take 0.5 Tablets by mouth 2 times a day.  Dose: 12.5 mg     nitroglycerin 0.4 MG Subl  Commonly known as: NITROSTAT   Place 1 Tablet under the tongue as needed for Chest Pain.  Dose: 0.4 mg     rosuvastatin 10 MG Tabs  Commonly known as: CRESTOR   Take 1 Tablet by mouth every evening.  Dose: 10 mg              Allergies  Allergies   Allergen Reactions    Bactrim [Sulfamethoxazole W-Trimethoprim] Rash and Swelling     Swelling, rash and blistering in the groin    Other Drug Swelling     Numbing eye drop caused eye to swell shut, patient unable to recall name       DIET  Orders Placed This Encounter   Procedures    Diet Order Diet: Cardiac; Miscellaneous modifications: (optional): No Decaf, No Caffeine(for test)     Standing Status:   Standing     Number of Occurrences:   1     Order Specific Question:   Diet:     Answer:   Cardiac  [6]     Order Specific Question:   Miscellaneous modifications: (optional)     Answer:   No Decaf, No Caffeine(for test) [11]       ACTIVITY  As tolerated.  Weight bearing as tolerated    CONSULTATIONS  None    PROCEDURES  None    LABORATORY  Lab Results   Component Value Date    SODIUM 139 05/28/2023    POTASSIUM 4.1 05/28/2023    CHLORIDE 105 05/28/2023    CO2 19 (L) 05/28/2023    GLUCOSE 123 (H) 05/28/2023    BUN 14 05/28/2023    CREATININE 0.95 05/28/2023        Lab Results   Component Value Date    WBC 6.4 05/28/2023    HEMOGLOBIN 15.4 05/28/2023    HEMATOCRIT 46.3 05/28/2023    PLATELETCT 228 05/28/2023        I discussed medications and side effects with the patient.  I discussed prognosis and importance of medical compliance with the patient.  I counseled the patient about diet, exercise, weight loss, smoking cessation, and life style modifications.  All questions and concerns have been addressed.  Total time of the discharge process was 37 minutes.

## 2023-05-28 NOTE — CARE PLAN
The patient is Stable - Low risk of patient condition declining or worsening    Shift Goals  Clinical Goals: Monitor for chest pain  Patient Goals: Go home    Progress made toward(s) clinical / shift goals:    Problem: Knowledge Deficit - Standard  Goal: Patient and family/care givers will demonstrate understanding of plan of care, disease process/condition, diagnostic tests and medications  Description: Target End Date:  1-3 days or as soon as patient condition allows    Document in Patient Education    1.  Patient and family/caregiver oriented to unit, equipment, visitation policy and means for communicating concern  2.  Complete/review Learning Assessment  3.  Assess knowledge level of disease process/condition, treatment plan, diagnostic tests and medications  4.  Explain disease process/condition, treatment plan, diagnostic tests and medications  Outcome: Progressing

## 2023-05-28 NOTE — CARE PLAN
The patient is Stable - Low risk of patient condition declining or worsening    Shift Goals  Clinical Goals: NM stress test in AM  Patient Goals: Discharge  Family Goals: ARCHANA      Problem: Knowledge Deficit - Standard  Goal: Patient and family/care givers will demonstrate understanding of plan of care, disease process/condition, diagnostic tests and medications  Outcome: Progressing

## 2023-06-15 ENCOUNTER — APPOINTMENT (OUTPATIENT)
Dept: CARDIOLOGY | Facility: PHYSICIAN GROUP | Age: 53
End: 2023-06-15
Payer: COMMERCIAL

## 2023-11-10 ENCOUNTER — OFFICE VISIT (OUTPATIENT)
Dept: URGENT CARE | Facility: PHYSICIAN GROUP | Age: 53
End: 2023-11-10
Payer: COMMERCIAL

## 2023-11-10 VITALS
RESPIRATION RATE: 18 BRPM | HEIGHT: 69 IN | SYSTOLIC BLOOD PRESSURE: 122 MMHG | BODY MASS INDEX: 37.92 KG/M2 | WEIGHT: 256 LBS | OXYGEN SATURATION: 94 % | TEMPERATURE: 97.3 F | HEART RATE: 83 BPM | DIASTOLIC BLOOD PRESSURE: 68 MMHG

## 2023-11-10 DIAGNOSIS — R05.2 SUBACUTE COUGH: ICD-10-CM

## 2023-11-10 DIAGNOSIS — R09.81 NASAL CONGESTION: ICD-10-CM

## 2023-11-10 DIAGNOSIS — J01.00 ACUTE NON-RECURRENT MAXILLARY SINUSITIS: ICD-10-CM

## 2023-11-10 DIAGNOSIS — R53.83 FATIGUE, UNSPECIFIED TYPE: ICD-10-CM

## 2023-11-10 PROCEDURE — 3078F DIAST BP <80 MM HG: CPT

## 2023-11-10 PROCEDURE — 99213 OFFICE O/P EST LOW 20 MIN: CPT

## 2023-11-10 PROCEDURE — 3074F SYST BP LT 130 MM HG: CPT

## 2023-11-10 RX ORDER — CLOPIDOGREL BISULFATE 75 MG/1
75 TABLET ORAL DAILY
COMMUNITY
Start: 2023-10-25

## 2023-11-10 RX ORDER — AMOXICILLIN AND CLAVULANATE POTASSIUM 875; 125 MG/1; MG/1
1 TABLET, FILM COATED ORAL 2 TIMES DAILY
Qty: 10 TABLET | Refills: 0 | Status: SHIPPED | OUTPATIENT
Start: 2023-11-10 | End: 2023-11-15

## 2023-11-10 RX ORDER — ASPIRIN 81 MG/1
81 TABLET, CHEWABLE ORAL
COMMUNITY
Start: 2023-06-30

## 2023-11-10 ASSESSMENT — FIBROSIS 4 INDEX: FIB4 SCORE: 0.91

## 2023-11-10 NOTE — PROGRESS NOTES
Subjective     Domenic Glover is a 53 y.o. male who presents with Runny Nose (X 2 weeks- mucous got dark a week ago. Sinus pain in cheeks and eyes, going into teeth. No fever. )            HPI patient states he started getting sick about 2 weeks ago  Started with just some minor nasal congestion but it is just increased over the last 2 weeks  He states the pressure has been increasing behind his eyes  Is actually starting to make his upper teeth hurt  He has not had any fevers or chills but he is having some fatigue  He has been taking Claritin and using Flonase but its not helping very much    ROS same as above       Allergies   Allergen Reactions    Bactrim [Sulfamethoxazole W-Trimethoprim] Rash and Swelling     Swelling, rash and blistering in the groin    Other Drug Swelling     Numbing eye drop caused eye to swell shut, patient unable to recall name       Current Outpatient Medications   Medication Sig    clopidogrel (PLAVIX) 75 MG Tab Take 75 mg by mouth every day.    aspirin (ASA) 81 MG Chew Tab chewable tablet 81 mg.    amoxicillin-clavulanate (AUGMENTIN) 875-125 MG Tab Take 1 Tablet by mouth 2 times a day for 5 days.    loratadine (CLARITIN) 10 MG Tab Take 10 mg by mouth every day.    fluticasone (FLONASE) 50 MCG/ACT nasal spray Administer 1 Spray into each nostril every day.    albuterol 108 (90 Base) MCG/ACT Aero Soln inhalation aerosol Inhale 2 Puffs every four hours as needed for Shortness of Breath.    metoprolol tartrate (LOPRESSOR) 25 MG Tab Take 0.5 Tablets by mouth 2 times a day.    rosuvastatin (CRESTOR) 10 MG Tab Take 1 Tablet by mouth every evening.    nitroglycerin (NITROSTAT) 0.4 MG SL Tab Place 1 Tablet under the tongue as needed for Chest Pain.    escitalopram (LEXAPRO) 20 MG tablet Take 20 mg by mouth every day.    carbamazepine (CARBATROL) 300 MG CR capsule Take 1 Capsule by mouth 2 times a day.        Past Medical History:   Diagnosis Date    Allergic rhinitis     Anesthesia      "Prolonged nausea and vomiting    Arthritis     CAD (coronary artery disease) 03/10/2022    PCI/ANA x 2: OM (Resolute [place] 2.25 x 18mm) and LCx/OM (Resolute [place] 2.5 x 15mm) by Dr. Peraza.    Dental disorder     crown lower left    GERD (gastroesophageal reflux disease)     History of nephrolithiasis     Hypertriglyceridemia     LBP (low back pain)     MDD (major depressive disorder)     KRISTYN (obstructive sleep apnea)     Premature ventricular contractions (PVCs) (VPCs) 02/03/2012    Stress echocardiogram negative for ischemia, EF at rest 63%. Holter monitor with multiple interpolated PVCs, with occasional bigemy with pauses, resolve with exercise.    Psychiatric problem     Depression    Renal disorder     History of kidney stones    Snoring         Social History     Socioeconomic History    Marital status:    Tobacco Use    Smoking status: Never    Smokeless tobacco: Never   Vaping Use    Vaping Use: Never used   Substance and Sexual Activity    Alcohol use: No    Drug use: No    Sexual activity: Not Currently     Partners: Female     Comment:    Social History Narrative                  Objective     /68   Pulse 83   Temp 36.3 °C (97.3 °F) (Temporal)   Resp 18   Ht 1.753 m (5' 9\")   Wt 116 kg (256 lb)   SpO2 94%   BMI 37.80 kg/m²      Physical Exam  Constitutional:       Appearance: Normal appearance. He is not ill-appearing.   HENT:      Head: Normocephalic and atraumatic.      Right Ear: Tympanic membrane, ear canal and external ear normal.      Left Ear: Tympanic membrane, ear canal and external ear normal.      Nose: Congestion present.      Right Sinus: Maxillary sinus tenderness present. No frontal sinus tenderness.      Left Sinus: Maxillary sinus tenderness present. No frontal sinus tenderness.      Mouth/Throat:      Mouth: Mucous membranes are moist.      Pharynx: Uvula midline. No oropharyngeal exudate or posterior oropharyngeal erythema.   Eyes:      " Conjunctiva/sclera: Conjunctivae normal.   Cardiovascular:      Rate and Rhythm: Normal rate and regular rhythm.      Heart sounds: Normal heart sounds. No murmur heard.  Pulmonary:      Effort: Pulmonary effort is normal. No respiratory distress.      Breath sounds: Normal breath sounds. No stridor. No wheezing, rhonchi or rales.   Musculoskeletal:         General: Normal range of motion.      Cervical back: Normal range of motion and neck supple. No rigidity or tenderness.   Lymphadenopathy:      Head:      Right side of head: Tonsillar adenopathy present. No submental, submandibular, preauricular, posterior auricular or occipital adenopathy.      Left side of head: Tonsillar adenopathy present. No submental, submandibular, preauricular, posterior auricular or occipital adenopathy.      Cervical: Cervical adenopathy present.      Right cervical: Superficial cervical adenopathy present. No deep or posterior cervical adenopathy.     Left cervical: Superficial cervical adenopathy present. No deep or posterior cervical adenopathy.      Upper Body:      Right upper body: No supraclavicular adenopathy.      Left upper body: No supraclavicular adenopathy.   Skin:     General: Skin is warm and dry.   Neurological:      Mental Status: He is alert and oriented to person, place, and time.         Assessment & Plan     Patient in today due to 2 weeks of increasing nasal congestion and sinus pain and pressure.  He states now it is getting to the point where it is hurting all around his eyes, the pain and pressure is starting to cause his upper back continues to hurt.  He has been having some increasing fatigue, has not had any fevers or chills    On exam tympanic membranes are pearly white bilaterally.  There is nasal congestion.  There is pain with palpation of maxillary sinuses bilaterally, no pain with palpation of frontal sinuses bilaterally.  There is tonsillar and superficial cervical adenopathy bilaterally, tender with  palpation.  Lung sounds are clear, no wheezing, no rhonchi.  Heart sounds are normal, regular rhythm, no murmur.  Discussed exam indicative of acute maxillary sinusitis.  Antibiotics called into pharmacy of choice.  Discussed continued use of OTC Tylenol/ibuprofen, Flonase, warm salt water gargles for symptom management.  Patient verbalized understanding and agreement with plan of care.        1. Acute non-recurrent maxillary sinusitis  amoxicillin-clavulanate (AUGMENTIN) 875-125 MG Tab      2. Nasal congestion        3. Subacute cough        4. Fatigue, unspecified type

## 2023-12-23 DIAGNOSIS — I25.10 CORONARY ARTERY DISEASE INVOLVING NATIVE CORONARY ARTERY OF NATIVE HEART WITHOUT ANGINA PECTORIS: ICD-10-CM

## 2023-12-23 DIAGNOSIS — E78.2 MIXED HYPERLIPIDEMIA: ICD-10-CM

## 2023-12-26 RX ORDER — ROSUVASTATIN CALCIUM 10 MG/1
10 TABLET, COATED ORAL EVERY EVENING
Qty: 30 TABLET | Refills: 0 | Status: SHIPPED | OUTPATIENT
Start: 2023-12-26

## 2023-12-26 NOTE — TELEPHONE ENCOUNTER
Is the patient due for a refill? Yes    Was the patient seen the past year? No    Date of last office visit: 11/17/22    Does the patient have an upcoming appointment?  No     Provider to refill:AB    Does the patients insurance require a 100 day supply?  No

## 2023-12-30 DIAGNOSIS — I49.3 PREMATURE VENTRICULAR CONTRACTIONS (PVCS) (VPCS): ICD-10-CM

## 2023-12-30 DIAGNOSIS — I25.10 CORONARY ARTERY DISEASE INVOLVING NATIVE CORONARY ARTERY OF NATIVE HEART WITHOUT ANGINA PECTORIS: ICD-10-CM

## 2024-01-02 NOTE — TELEPHONE ENCOUNTER
Is the patient due for a refill? Yes    Was the patient seen the past year? No    Date of last office visit: 11/17/2022    Does the patient have an upcoming appointment?  No    Provider to refill:AB    Does the patients insurance require a 100 day supply?  No

## 2024-01-22 ENCOUNTER — OFFICE VISIT (OUTPATIENT)
Dept: URGENT CARE | Facility: PHYSICIAN GROUP | Age: 54
End: 2024-01-22
Payer: COMMERCIAL

## 2024-01-22 VITALS
HEART RATE: 83 BPM | OXYGEN SATURATION: 95 % | TEMPERATURE: 97.6 F | RESPIRATION RATE: 16 BRPM | HEIGHT: 69 IN | DIASTOLIC BLOOD PRESSURE: 78 MMHG | SYSTOLIC BLOOD PRESSURE: 122 MMHG | WEIGHT: 248 LBS | BODY MASS INDEX: 36.73 KG/M2

## 2024-01-22 DIAGNOSIS — J01.00 ACUTE NON-RECURRENT MAXILLARY SINUSITIS: Primary | ICD-10-CM

## 2024-01-22 PROCEDURE — 3078F DIAST BP <80 MM HG: CPT | Performed by: PHYSICIAN ASSISTANT

## 2024-01-22 PROCEDURE — 3074F SYST BP LT 130 MM HG: CPT | Performed by: PHYSICIAN ASSISTANT

## 2024-01-22 PROCEDURE — 99213 OFFICE O/P EST LOW 20 MIN: CPT | Performed by: PHYSICIAN ASSISTANT

## 2024-01-22 RX ORDER — AMOXICILLIN AND CLAVULANATE POTASSIUM 875; 125 MG/1; MG/1
1 TABLET, FILM COATED ORAL 2 TIMES DAILY
Qty: 14 TABLET | Refills: 0 | Status: SHIPPED | OUTPATIENT
Start: 2024-01-26 | End: 2024-02-02

## 2024-01-22 ASSESSMENT — FIBROSIS 4 INDEX: FIB4 SCORE: 0.91

## 2024-01-22 NOTE — PROGRESS NOTES
"Subjective:   Domenic Glover is a 53 y.o. male who presents for Sinusitis (X2-3 days sinus pain, yellow green phlegm, sneezing, cough, headache, )      HPI  The patient presents to the Urgent Care   With complaints of sinus infection onset 2 days ago.  Reports of nasal congestion, cough, and slight fever yesterday.  Reports maxillary sinus pain.  Some diarrhea.  Denies any chest pain, SOB, vomiting. History of sinus infections.             Past Medical History:   Diagnosis Date    Allergic rhinitis     Anesthesia     Prolonged nausea and vomiting    Arthritis     CAD (coronary artery disease) 03/10/2022    PCI/ANA x 2: OM (Resolute [place] 2.25 x 18mm) and LCx/OM (Resolute [place] 2.5 x 15mm) by Dr. Peraza.    Dental disorder     crown lower left    GERD (gastroesophageal reflux disease)     History of nephrolithiasis     Hypertriglyceridemia     LBP (low back pain)     MDD (major depressive disorder)     KRISTYN (obstructive sleep apnea)     Premature ventricular contractions (PVCs) (VPCs) 02/03/2012    Stress echocardiogram negative for ischemia, EF at rest 63%. Holter monitor with multiple interpolated PVCs, with occasional bigemy with pauses, resolve with exercise.    Psychiatric problem     Depression    Renal disorder     History of kidney stones    Snoring      Allergies   Allergen Reactions    Bactrim [Sulfamethoxazole W-Trimethoprim] Rash and Swelling     Swelling, rash and blistering in the groin    Other Drug Swelling     Numbing eye drop caused eye to swell shut, patient unable to recall name        Objective:     /78   Pulse 83   Temp 36.4 °C (97.6 °F) (Temporal)   Resp 16   Ht 1.753 m (5' 9\")   Wt 112 kg (248 lb)   SpO2 95%   BMI 36.62 kg/m²     Physical Exam  Vitals reviewed.   Constitutional:       General: He is not in acute distress.     Appearance: Normal appearance. He is not ill-appearing or toxic-appearing.   HENT:      Right Ear: Tympanic membrane normal.      Left Ear: Tympanic membrane " normal.      Nose: Mucosal edema and rhinorrhea present. Rhinorrhea is clear.      Right Sinus: Maxillary sinus tenderness present.      Left Sinus: Maxillary sinus tenderness present.      Mouth/Throat:      Mouth: Mucous membranes are moist.      Pharynx: Oropharynx is clear.   Eyes:      Conjunctiva/sclera: Conjunctivae normal.   Cardiovascular:      Rate and Rhythm: Normal rate and regular rhythm.      Heart sounds: Normal heart sounds.   Pulmonary:      Effort: Pulmonary effort is normal. No respiratory distress.      Breath sounds: Normal breath sounds. No wheezing, rhonchi or rales.   Musculoskeletal:      Cervical back: Neck supple. No rigidity.   Skin:     General: Skin is warm and dry.   Neurological:      General: No focal deficit present.      Mental Status: He is alert and oriented to person, place, and time.   Psychiatric:         Mood and Affect: Mood normal.         Behavior: Behavior normal.         Diagnosis and associated orders:     1. Acute non-recurrent maxillary sinusitis  - amoxicillin-clavulanate (AUGMENTIN) 875-125 MG Tab; Take 1 Tablet by mouth 2 times a day for 7 days.  Dispense: 14 Tablet; Refill: 0       Comments/MDM:     Patient's presenting symptoms and exam findings are consistent with sinusitis most likely viral etiology. Therefore, antibiotics are not indicated at this time.   Through shared decision making, agreed on contingent antibiotic prescription given to patient to fill upon meeting criteria of strict guidelines discussed in length.   I recommend plenty of fluids, Flonase nasal spray, nasal saline washes or ludivina pot, Mucinex decongestant, Ibuprofen or Tylenol for pain, non-drowsy antihistamine such as Zyrtec or Claritin.        I personally reviewed prior external notes and test results pertinent to today's visit. Pathogenesis of diagnosis discussed including typical length and natural progression. Supportive care, natural history, differential diagnoses, and indications  for immediate follow-up discussed. Patient expresses understanding and agrees to plan. Patient denies any other questions or concerns.     Follow-up with the primary care physician for recheck, reevaluation, and consideration of further management.    Please note that this dictation was created using voice recognition software. I have made a reasonable attempt to correct obvious errors, but I expect that there are errors of grammar and possibly content that I did not discover before finalizing the note.    This note was electronically signed by Ben Lazo PA-C

## 2024-01-24 ENCOUNTER — OFFICE VISIT (OUTPATIENT)
Dept: URGENT CARE | Facility: PHYSICIAN GROUP | Age: 54
End: 2024-01-24
Payer: COMMERCIAL

## 2024-01-24 VITALS
DIASTOLIC BLOOD PRESSURE: 70 MMHG | SYSTOLIC BLOOD PRESSURE: 112 MMHG | OXYGEN SATURATION: 96 % | TEMPERATURE: 97.3 F | HEIGHT: 69 IN | BODY MASS INDEX: 36.73 KG/M2 | HEART RATE: 78 BPM | RESPIRATION RATE: 16 BRPM | WEIGHT: 248 LBS

## 2024-01-24 DIAGNOSIS — U07.1 COVID-19: ICD-10-CM

## 2024-01-24 PROBLEM — E78.5 HLD (HYPERLIPIDEMIA): Status: ACTIVE | Noted: 2022-11-17

## 2024-01-24 PROBLEM — I10 HTN (HYPERTENSION): Status: ACTIVE | Noted: 2024-01-24

## 2024-01-24 PROBLEM — G47.30 SLEEP APNEA: Status: ACTIVE | Noted: 2022-11-17

## 2024-01-24 PROCEDURE — 3074F SYST BP LT 130 MM HG: CPT | Performed by: NURSE PRACTITIONER

## 2024-01-24 PROCEDURE — 3078F DIAST BP <80 MM HG: CPT | Performed by: NURSE PRACTITIONER

## 2024-01-24 PROCEDURE — 99213 OFFICE O/P EST LOW 20 MIN: CPT | Performed by: NURSE PRACTITIONER

## 2024-01-24 RX ORDER — ISOSORBIDE MONONITRATE 30 MG/1
30 TABLET, EXTENDED RELEASE ORAL
COMMUNITY
Start: 2023-06-30

## 2024-01-24 ASSESSMENT — FIBROSIS 4 INDEX: FIB4 SCORE: 0.91

## 2024-01-24 NOTE — PROGRESS NOTES
"Subjective:   Domenic Glover is a 53 y.o. male who presents for Cough (Positive covid test at home last night- no taste or smell. )    Patient is a 53-year-old male who presents to clinic today with 5-day history of runny nose, nasal congestion, cough, intermittent shortness of breath, fever 4 days ago, diarrhea, headache, maxillary sinus pain, and lost taste and smell this morning.  He did take an at home COVID test which was positive.  He denies any chest pain, vomiting, or nausea.  He was seen in clinic on 1/22/2024 and was diagnosed with acute maxillary sinusitis, antibiotics were prescribed which patient did start due to meeting guidelines discussed at last office visit.  Patient today is requesting Paxlovid as he states he has tolerated this well in the past and it did help with his COVID symptoms.    Medications, Allergies, and current problem list reviewed today in Epic.     Objective:     /70   Pulse 78   Temp 36.3 °C (97.3 °F) (Temporal)   Resp 16   Ht 1.753 m (5' 9\")   Wt 112 kg (248 lb)   SpO2 96%     Physical Exam  Vitals reviewed.   Constitutional:       General: He is not in acute distress.     Appearance: Normal appearance. He is ill-appearing. He is not toxic-appearing.   HENT:      Head: Normocephalic.      Right Ear: Tympanic membrane, ear canal and external ear normal.      Left Ear: Tympanic membrane, ear canal and external ear normal.      Nose: Mucosal edema and rhinorrhea present. No congestion. Rhinorrhea is clear.      Right Turbinates: Swollen.      Left Turbinates: Swollen.      Right Sinus: Maxillary sinus tenderness present. No frontal sinus tenderness.      Left Sinus: Maxillary sinus tenderness present. No frontal sinus tenderness.      Mouth/Throat:      Mouth: Mucous membranes are moist.      Pharynx: No oropharyngeal exudate or posterior oropharyngeal erythema.   Eyes:      Extraocular Movements: Extraocular movements intact.      Conjunctiva/sclera: Conjunctivae " normal.      Pupils: Pupils are equal, round, and reactive to light.   Cardiovascular:      Rate and Rhythm: Normal rate and regular rhythm.      Heart sounds: No murmur heard.  Pulmonary:      Effort: Pulmonary effort is normal. No respiratory distress.      Breath sounds: Normal breath sounds. No stridor. No wheezing, rhonchi or rales.   Musculoskeletal:         General: Normal range of motion.      Cervical back: Normal range of motion and neck supple.   Lymphadenopathy:      Cervical: No cervical adenopathy.   Skin:     General: Skin is warm and dry.   Neurological:      General: No focal deficit present.      Mental Status: He is alert and oriented to person, place, and time.   Psychiatric:         Mood and Affect: Mood normal.         Behavior: Behavior normal.         ESTIMATED GFR  Order: [id number] - Reflex for Order [id number]  Status: Final result       Visible to patient: Yes (not seen)       Next appt: None    0 Result Notes        Component  Ref Range & Units 8 mo ago  (5/28/23) 8 mo ago  (5/27/23) 1 yr ago  (12/20/22)   GFR (CKD-EPI)  >60 mL/min/1.73 m 2 96 98  CM          Assessment/Plan:     Diagnosis and associated orders:     1. COVID-19  Nirmatrelvir&Ritonavir 300/100 20 x 150 MG & 10 x 100MG Tablet Therapy Pack         Comments/MDM:     Home COVID test was positive.  Patient is currently in stable condition with normal vital signs.  At length discussion with patient regards to side effects medication.  Patient does meet criteria for paxlovid.  Patient does understand the risk associated with starting Paxlovid.  Reviewed recent labs indicating normal renal function and GFR levels. Patient denies any history of decreased renal function or renal impairment.  Prescription was sent to requested pharmacy.  Did discuss with patient possible interaction with carbamazepine however patient was taking this during the last time he took Paxlovid without any adverse effects.  OTC Tylenol or Motrin for  fever/discomfort.  OTC cough/cold medication for symptomatic relief  OTC Supportive Care for Congestion - saline nasal spray or neti pot  Drink plenty of fluids  Advised the patient to stay at home under self-isolation according to CDC guidelines.  Provided the patient with home isolation and self quarantine instructions  Work  note provided  Follow-up with PCP  Return to clinic or go to the ED if symptoms worsen or fail to improve, or if the patient should develop worsening/increasing cough, congestion, ear pain, sore throat, shortness of breath, wheezing, chest pain, fever/chills, and/or any concerning symptoms.   Patient was involved with shared decision-making throughout the exam today and verbalizes understanding regards to plan of care, discharge instructions, and follow-up         Differential diagnosis, natural history, supportive care, and indications for immediate follow-up discussed.    Advised the patient to follow-up with the primary care physician for recheck, reevaluation, and consideration of further management.    I personally reviewed prior external notes and test results pertinent to today's visit as well as additional imaging and testing completed in clinic today.     Please note that this dictation was created using voice recognition software. I have made a reasonable attempt to correct obvious errors, but I expect that there are errors of grammar and possibly content that I did not discover before finalizing the note.

## 2024-01-24 NOTE — LETTER
[facility] [place] [address]     January 24, 2024    Patient: Domenic Glover   YOB: 1970   Date of Visit: 1/24/2024       To Whom It May Concern:    To Whom it may Concern,    Today your employee tested positive for COVID-19.       They can leave their home on 0125/2023 (5 days after symptoms started) if their symptoms are improving and have been fever free for 24 hours without the use of fever-reducing medication.    Wear a high-quality mask around other people and attend work through 1/29/2023 (Full 10 days of quarantine).    If their symptoms are improving, have been fever-free for 24 hours, without the use of fever-reducing medication, and they have access to home antigen tests, they can consider using them after their isolation (5-day castro). With 2 sequential negative tests 48 hours apart, they may remove their mask sooner than (10-day castro). If their antigen test results are positive, they may still be infectious and should not remove their mask around others.   Continue taking antigen tests at least 48 hours apart until they have 2 sequential negative results. This may mean they will need to continue wearing a mask and testing beyond day 10.    [facility] does not retest patients or provide further work notes.  This is the only note that will be provided from the [facility] for this visit. Your employee will require an appointment with a primary care provider if FMLA or disability forms are required.      Sincerely,       Emily Solitario, [email or web address].      Sincerely,     SANTIAGO [email or web address].

## 2024-09-20 ENCOUNTER — HOSPITAL ENCOUNTER (OUTPATIENT)
Dept: RADIOLOGY | Facility: MEDICAL CENTER | Age: 54
End: 2024-09-20
Attending: FAMILY MEDICINE
Payer: COMMERCIAL

## 2024-09-20 DIAGNOSIS — N64.4 MASTODYNIA: ICD-10-CM

## 2024-09-20 PROCEDURE — 76642 ULTRASOUND BREAST LIMITED: CPT | Mod: RT

## 2024-09-20 PROCEDURE — G0279 TOMOSYNTHESIS, MAMMO: HCPCS

## 2025-04-05 ENCOUNTER — OFFICE VISIT (OUTPATIENT)
Dept: URGENT CARE | Facility: PHYSICIAN GROUP | Age: 55
End: 2025-04-05
Payer: COMMERCIAL

## 2025-04-05 VITALS
TEMPERATURE: 97.3 F | WEIGHT: 257 LBS | OXYGEN SATURATION: 96 % | RESPIRATION RATE: 16 BRPM | HEART RATE: 64 BPM | HEIGHT: 69 IN | DIASTOLIC BLOOD PRESSURE: 74 MMHG | SYSTOLIC BLOOD PRESSURE: 122 MMHG | BODY MASS INDEX: 38.06 KG/M2

## 2025-04-05 DIAGNOSIS — R56.9 SEIZURE (HCC): ICD-10-CM

## 2025-04-05 PROCEDURE — 3078F DIAST BP <80 MM HG: CPT | Performed by: FAMILY MEDICINE

## 2025-04-05 PROCEDURE — 3074F SYST BP LT 130 MM HG: CPT | Performed by: FAMILY MEDICINE

## 2025-04-05 PROCEDURE — 1126F AMNT PAIN NOTED NONE PRSNT: CPT | Performed by: FAMILY MEDICINE

## 2025-04-05 PROCEDURE — 99213 OFFICE O/P EST LOW 20 MIN: CPT | Performed by: FAMILY MEDICINE

## 2025-04-05 RX ORDER — CETIRIZINE HYDROCHLORIDE 10 MG/1
10 TABLET ORAL DAILY
COMMUNITY

## 2025-04-05 RX ORDER — CARBAMAZEPINE 300 MG/1
300 CAPSULE, EXTENDED RELEASE ORAL 2 TIMES DAILY
Qty: 60 CAPSULE | Refills: 1 | Status: SHIPPED | OUTPATIENT
Start: 2025-04-05

## 2025-04-05 ASSESSMENT — FIBROSIS 4 INDEX: FIB4 SCORE: 0.94

## 2025-04-05 ASSESSMENT — PAIN SCALES - GENERAL: PAINLEVEL_OUTOF10: NO PAIN

## 2025-04-05 NOTE — PROGRESS NOTES
"Has seizure disorder      Last seizure was many years ago.        Has been on Tegratol x 5 yrs.    Denies breakthrough seizures.       Currently unable to reach prescribing physician and will run out soon      OBJECTIVE  /74   Pulse 64   Temp 36.3 °C (97.3 °F) (Temporal)   Resp 16   Ht 1.753 m (5' 9\")   Wt 117 kg (257 lb)   SpO2 96%     HEENT - PERRLA, EOMI  Neuro - alert and oriented x3. CN 2-12 grossly intact.  Lungs - CTA. No wheezes, rhonchi or rales.  Heart - regular rate and rhythm without murmur.  Abdomen - soft and non-tender, bowel sounds active x4.  Musculoskeletal - No lower extremity edema noted.  Price's sign negative bilaterally        A/P:    1. Seizure (HCC)  Stable.  Tegratol refilled  Advised f/u neurologist for further refills and f/u.     - carbamazepine (CARBATROL) 300 MG CR capsule; Take 1 Capsule by mouth 2 times a day.  Dispense: 60 Capsule; Refill: 1          "

## 2025-04-18 ENCOUNTER — HOSPITAL ENCOUNTER (OUTPATIENT)
Dept: LAB | Facility: MEDICAL CENTER | Age: 55
End: 2025-04-18
Attending: PHYSICIAN ASSISTANT
Payer: COMMERCIAL

## 2025-04-18 LAB
ALBUMIN SERPL BCP-MCNC: 4.1 G/DL (ref 3.2–4.9)
ALBUMIN/GLOB SERPL: 2 G/DL
ALP SERPL-CCNC: 125 U/L (ref 30–99)
ALT SERPL-CCNC: 21 U/L (ref 2–50)
ANION GAP SERPL CALC-SCNC: 13 MMOL/L (ref 7–16)
AST SERPL-CCNC: 19 U/L (ref 12–45)
BASOPHILS # BLD AUTO: 1.4 % (ref 0–1.8)
BASOPHILS # BLD: 0.1 K/UL (ref 0–0.12)
BILIRUB SERPL-MCNC: 0.4 MG/DL (ref 0.1–1.5)
BUN SERPL-MCNC: 15 MG/DL (ref 8–22)
CALCIUM ALBUM COR SERPL-MCNC: 9 MG/DL (ref 8.5–10.5)
CALCIUM SERPL-MCNC: 9.1 MG/DL (ref 8.5–10.5)
CHLORIDE SERPL-SCNC: 103 MMOL/L (ref 96–112)
CHOLEST SERPL-MCNC: 158 MG/DL (ref 100–199)
CO2 SERPL-SCNC: 24 MMOL/L (ref 20–33)
CREAT SERPL-MCNC: 0.91 MG/DL (ref 0.5–1.4)
EOSINOPHIL # BLD AUTO: 0.31 K/UL (ref 0–0.51)
EOSINOPHIL NFR BLD: 4.3 % (ref 0–6.9)
ERYTHROCYTE [DISTWIDTH] IN BLOOD BY AUTOMATED COUNT: 37.6 FL (ref 35.9–50)
FASTING STATUS PATIENT QL REPORTED: NORMAL
GFR SERPLBLD CREATININE-BSD FMLA CKD-EPI: 99 ML/MIN/1.73 M 2
GLOBULIN SER CALC-MCNC: 2.1 G/DL (ref 1.9–3.5)
GLUCOSE SERPL-MCNC: 106 MG/DL (ref 65–99)
HCT VFR BLD AUTO: 48 % (ref 42–52)
HDLC SERPL-MCNC: 33 MG/DL
HGB BLD-MCNC: 15.8 G/DL (ref 14–18)
IMM GRANULOCYTES # BLD AUTO: 0.11 K/UL (ref 0–0.11)
IMM GRANULOCYTES NFR BLD AUTO: 1.5 % (ref 0–0.9)
LDLC SERPL CALC-MCNC: 82 MG/DL
LYMPHOCYTES # BLD AUTO: 1.52 K/UL (ref 1–4.8)
LYMPHOCYTES NFR BLD: 21.1 % (ref 22–41)
MCH RBC QN AUTO: 27.8 PG (ref 27–33)
MCHC RBC AUTO-ENTMCNC: 32.9 G/DL (ref 32.3–36.5)
MCV RBC AUTO: 84.4 FL (ref 81.4–97.8)
MONOCYTES # BLD AUTO: 0.58 K/UL (ref 0–0.85)
MONOCYTES NFR BLD AUTO: 8.1 % (ref 0–13.4)
NEUTROPHILS # BLD AUTO: 4.58 K/UL (ref 1.82–7.42)
NEUTROPHILS NFR BLD: 63.6 % (ref 44–72)
NRBC # BLD AUTO: 0 K/UL
NRBC BLD-RTO: 0 /100 WBC (ref 0–0.2)
PLATELET # BLD AUTO: 294 K/UL (ref 164–446)
PMV BLD AUTO: 8.5 FL (ref 9–12.9)
POTASSIUM SERPL-SCNC: 4.7 MMOL/L (ref 3.6–5.5)
PROT SERPL-MCNC: 6.2 G/DL (ref 6–8.2)
PSA SERPL DL<=0.01 NG/ML-MCNC: 0.2 NG/ML (ref 0–4)
RBC # BLD AUTO: 5.69 M/UL (ref 4.7–6.1)
SODIUM SERPL-SCNC: 140 MMOL/L (ref 135–145)
TRIGL SERPL-MCNC: 214 MG/DL (ref 0–149)
TSH SERPL-ACNC: 2.07 UIU/ML (ref 0.38–5.33)
WBC # BLD AUTO: 7.2 K/UL (ref 4.8–10.8)

## 2025-04-18 PROCEDURE — 85025 COMPLETE CBC W/AUTO DIFF WBC: CPT

## 2025-04-18 PROCEDURE — 36415 COLL VENOUS BLD VENIPUNCTURE: CPT

## 2025-04-18 PROCEDURE — 80053 COMPREHEN METABOLIC PANEL: CPT

## 2025-04-18 PROCEDURE — 80061 LIPID PANEL: CPT

## 2025-04-18 PROCEDURE — 84153 ASSAY OF PSA TOTAL: CPT

## 2025-04-18 PROCEDURE — 84443 ASSAY THYROID STIM HORMONE: CPT
